# Patient Record
Sex: FEMALE | Race: WHITE | NOT HISPANIC OR LATINO | Employment: UNEMPLOYED | URBAN - METROPOLITAN AREA
[De-identification: names, ages, dates, MRNs, and addresses within clinical notes are randomized per-mention and may not be internally consistent; named-entity substitution may affect disease eponyms.]

---

## 2021-03-23 ENCOUNTER — GYNECOLOGY VISIT (OUTPATIENT)
Dept: OBGYN | Facility: CLINIC | Age: 31
End: 2021-03-23
Payer: MEDICAID

## 2021-03-23 ENCOUNTER — APPOINTMENT (OUTPATIENT)
Dept: OBGYN | Facility: CLINIC | Age: 31
End: 2021-03-23

## 2021-03-23 VITALS — SYSTOLIC BLOOD PRESSURE: 125 MMHG | DIASTOLIC BLOOD PRESSURE: 81 MMHG | WEIGHT: 243 LBS

## 2021-03-23 DIAGNOSIS — N93.8 DUB (DYSFUNCTIONAL UTERINE BLEEDING): Primary | ICD-10-CM

## 2021-03-23 PROCEDURE — 76857 US EXAM PELVIC LIMITED: CPT | Performed by: OBSTETRICS & GYNECOLOGY

## 2021-03-23 PROCEDURE — 99203 OFFICE O/P NEW LOW 30 MIN: CPT | Mod: 25 | Performed by: OBSTETRICS & GYNECOLOGY

## 2021-03-23 NOTE — NON-PROVIDER
Pt here today for   Pt states has not experienced nausea anymore.   Issac # [phone]   Pharmacy Confirmed.  UPT

## 2021-03-23 NOTE — PROGRESS NOTES
Jasmyne Gonzalo,  30 y.o.  female presents today with a C/O of :oligomenorrhea. Pt   Patient's last menstrual period was 10/31/2020 (approximate).  Transfer from [place]      Subjective : Nausea/Vomiting: No:  Abdominal /pelvic cramping : No :   vaginal bleeding:No      Menstrual Flow : moderate   GYN ROS:  normal menses, no abnormal bleeding, pelvic pain or discharge, no breast pain or new or enlarging lumps on self exam      History reviewed. No pertinent past medical history.    History reviewed. No pertinent surgical history.    Current Birth control:  none    OB History    Para Term  AB Living   2 1 1     1   SAB TAB Ectopic Molar Multiple Live Births             1      # Outcome Date GA Lbr Golden/2nd Weight Sex Delivery Anes PTL Lv   2 Current            1 Term 2016 40w0d   F Vag-Spont   ANGELO           Allergy:      Patient has no known allergies.    Exam;    /81 (BP Location: Right arm, Patient Position: Sitting)   Wt 110 kg (243 lb)   LMP 10/31/2020 (Approximate)   well-appearing, well-hydrated, well-nourished, alert, in no apparent distress  normal;   PERRLA, EOMI, fundi grossly normal, no papilledema, no AV nicking, sclera clear  Clear  RRR No M  abdomen is soft without significant tenderness, masses, organomegaly or guarding  deferredLab.    No results found for this or any previous visit (from the past 336 hour(s)).  Ultrasound :    Per my Read   Transabdominal     Second/third trimester findings: BPD: 5.04 cm  Placenta localization: posterior Low lying , no previa   BPD/ HC/FL, c/w 21 weeks 2 days   Positive fetal /cardiac movement   Likely male   US LYNN 2021  Dates LYNN 2021    Assessment:    dysfunctional uterine bleeding    Plan:  2 weeks for NOB

## 2021-03-25 ENCOUNTER — HOSPITAL ENCOUNTER (OUTPATIENT)
Dept: LAB | Facility: MEDICAL CENTER | Age: 31
End: 2021-03-25
Attending: NURSE PRACTITIONER
Payer: MEDICAID

## 2021-03-25 ENCOUNTER — HOSPITAL ENCOUNTER (OUTPATIENT)
Facility: MEDICAL CENTER | Age: 31
End: 2021-03-25
Attending: NURSE PRACTITIONER
Payer: MEDICAID

## 2021-03-25 ENCOUNTER — INITIAL PRENATAL (OUTPATIENT)
Dept: OBGYN | Facility: CLINIC | Age: 31
End: 2021-03-25
Payer: MEDICAID

## 2021-03-25 VITALS
SYSTOLIC BLOOD PRESSURE: 120 MMHG | WEIGHT: 243.9 LBS | DIASTOLIC BLOOD PRESSURE: 60 MMHG | HEIGHT: 71 IN | BODY MASS INDEX: 34.15 KG/M2

## 2021-03-25 DIAGNOSIS — Z34.82 ENCOUNTER FOR SUPERVISION OF OTHER NORMAL PREGNANCY IN SECOND TRIMESTER: ICD-10-CM

## 2021-03-25 DIAGNOSIS — Z34.82 ENCOUNTER FOR SUPERVISION OF OTHER NORMAL PREGNANCY IN SECOND TRIMESTER: Primary | ICD-10-CM

## 2021-03-25 PROBLEM — Z34.92 ENCOUNTER FOR SUPERVISION OF NORMAL PREGNANCY IN SECOND TRIMESTER: Status: ACTIVE | Noted: 2021-03-25

## 2021-03-25 LAB
ABO GROUP BLD: NORMAL
AMORPH CRY #/AREA URNS HPF: PRESENT /HPF
APPEARANCE UR: ABNORMAL
APPEARANCE UR: CLEAR
BACTERIA #/AREA URNS HPF: ABNORMAL /HPF
BASOPHILS # BLD AUTO: 0.4 % (ref 0–1.8)
BASOPHILS # BLD: 0.04 K/UL (ref 0–0.12)
BILIRUB UR QL STRIP.AUTO: NEGATIVE
BILIRUB UR STRIP-MCNC: ABNORMAL MG/DL
BLD GP AB SCN SERPL QL: NORMAL
COLOR UR AUTO: YELLOW
COLOR UR: YELLOW
EOSINOPHIL # BLD AUTO: 0.13 K/UL (ref 0–0.51)
EOSINOPHIL NFR BLD: 1.2 % (ref 0–6.9)
ERYTHROCYTE [DISTWIDTH] IN BLOOD BY AUTOMATED COUNT: 42.8 FL (ref 35.9–50)
GLUCOSE UR STRIP.AUTO-MCNC: NEGATIVE MG/DL
GLUCOSE UR STRIP.AUTO-MCNC: NEGATIVE MG/DL
HBV SURFACE AG SER QL: ABNORMAL
HCT VFR BLD AUTO: 36.1 % (ref 37–47)
HCV AB SER QL: NORMAL
HGB BLD-MCNC: 12 G/DL (ref 12–16)
HIV 1+2 AB+HIV1 P24 AG SERPL QL IA: NORMAL
IMM GRANULOCYTES # BLD AUTO: 0.14 K/UL (ref 0–0.11)
IMM GRANULOCYTES NFR BLD AUTO: 1.3 % (ref 0–0.9)
KETONES UR STRIP.AUTO-MCNC: NEGATIVE MG/DL
KETONES UR STRIP.AUTO-MCNC: NEGATIVE MG/DL
LEUKOCYTE ESTERASE UR QL STRIP.AUTO: NEGATIVE
LEUKOCYTE ESTERASE UR QL STRIP.AUTO: NEGATIVE
LYMPHOCYTES # BLD AUTO: 1.89 K/UL (ref 1–4.8)
LYMPHOCYTES NFR BLD: 17.2 % (ref 22–41)
MCH RBC QN AUTO: 29.6 PG (ref 27–33)
MCHC RBC AUTO-ENTMCNC: 33.2 G/DL (ref 33.6–35)
MCV RBC AUTO: 89.1 FL (ref 81.4–97.8)
MICRO URNS: ABNORMAL
MONOCYTES # BLD AUTO: 0.52 K/UL (ref 0–0.85)
MONOCYTES NFR BLD AUTO: 4.7 % (ref 0–13.4)
NEUTROPHILS # BLD AUTO: 8.29 K/UL (ref 2–7.15)
NEUTROPHILS NFR BLD: 75.2 % (ref 44–72)
NITRITE UR QL STRIP.AUTO: NEGATIVE
NITRITE UR QL STRIP.AUTO: NEGATIVE
NRBC # BLD AUTO: 0 K/UL
NRBC BLD-RTO: 0 /100 WBC
PH UR STRIP.AUTO: 7.5 [PH] (ref 5–8)
PH UR STRIP.AUTO: 8.5 [PH] (ref 5–8)
PLATELET # BLD AUTO: 434 K/UL (ref 164–446)
PMV BLD AUTO: 9.6 FL (ref 9–12.9)
PROT UR QL STRIP: ABNORMAL MG/DL
PROT UR QL STRIP: NEGATIVE MG/DL
RBC # BLD AUTO: 4.05 M/UL (ref 4.2–5.4)
RBC UR QL AUTO: NEGATIVE
RBC UR QL AUTO: NEGATIVE
RH BLD: NORMAL
RUBV AB SER QL: 29.5 IU/ML
SP GR UR STRIP.AUTO: 1.02
SP GR UR STRIP.AUTO: 1.02
TREPONEMA PALLIDUM IGG+IGM AB [PRESENCE] IN SERUM OR PLASMA BY IMMUNOASSAY: ABNORMAL
UROBILINOGEN UR STRIP-MCNC: ABNORMAL MG/DL
UROBILINOGEN UR STRIP.AUTO-MCNC: 0.2 MG/DL
WBC # BLD AUTO: 11 K/UL (ref 4.8–10.8)
WBC #/AREA URNS HPF: ABNORMAL /HPF

## 2021-03-25 PROCEDURE — 81001 URINALYSIS AUTO W/SCOPE: CPT | Mod: XU

## 2021-03-25 PROCEDURE — 87591 N.GONORRHOEAE DNA AMP PROB: CPT

## 2021-03-25 PROCEDURE — 86762 RUBELLA ANTIBODY: CPT

## 2021-03-25 PROCEDURE — 36415 COLL VENOUS BLD VENIPUNCTURE: CPT

## 2021-03-25 PROCEDURE — 86901 BLOOD TYPING SEROLOGIC RH(D): CPT

## 2021-03-25 PROCEDURE — 86803 HEPATITIS C AB TEST: CPT

## 2021-03-25 PROCEDURE — 88175 CYTOPATH C/V AUTO FLUID REDO: CPT

## 2021-03-25 PROCEDURE — 87491 CHLMYD TRACH DNA AMP PROBE: CPT

## 2021-03-25 PROCEDURE — 81511 FTL CGEN ABNOR FOUR ANAL: CPT

## 2021-03-25 PROCEDURE — 85025 COMPLETE CBC W/AUTO DIFF WBC: CPT

## 2021-03-25 PROCEDURE — 86850 RBC ANTIBODY SCREEN: CPT

## 2021-03-25 PROCEDURE — 86900 BLOOD TYPING SEROLOGIC ABO: CPT

## 2021-03-25 PROCEDURE — 87086 URINE CULTURE/COLONY COUNT: CPT

## 2021-03-25 PROCEDURE — 81002 URINALYSIS NONAUTO W/O SCOPE: CPT | Performed by: NURSE PRACTITIONER

## 2021-03-25 PROCEDURE — 86780 TREPONEMA PALLIDUM: CPT

## 2021-03-25 PROCEDURE — 0500F INITIAL PRENATAL CARE VISIT: CPT | Mod: 25 | Performed by: NURSE PRACTITIONER

## 2021-03-25 PROCEDURE — 87340 HEPATITIS B SURFACE AG IA: CPT

## 2021-03-25 PROCEDURE — 80307 DRUG TEST PRSMV CHEM ANLYZR: CPT

## 2021-03-25 PROCEDURE — 87389 HIV-1 AG W/HIV-1&-2 AB AG IA: CPT

## 2021-03-25 ASSESSMENT — ENCOUNTER SYMPTOMS
MUSCULOSKELETAL NEGATIVE: 1
RESPIRATORY NEGATIVE: 1
PSYCHIATRIC NEGATIVE: 1
NEUROLOGICAL NEGATIVE: 1
CONSTITUTIONAL NEGATIVE: 1
EYES NEGATIVE: 1
GASTROINTESTINAL NEGATIVE: 1
CARDIOVASCULAR NEGATIVE: 1

## 2021-03-25 NOTE — PROGRESS NOTES
"Subjective:      S:  Jasmyne Sánchez is a 30 y.o.  female  @ She is 20w5d with an LYNN of Estimated Date of Delivery: 21 based off of LMP who presents for her new OB exam.      Her OB hx includes  at term x1.   History of HSV I or II in self or partner: no  History of STIs in self or partner: no  History of Thyroid problems: no  History of bipolar depression, not on any medications currently. Feeling emotional with pregnancy, feels her moods are stable otherwise.     No ER visits or previous care in this pregnancy. She has no complaints.  Desires AFP.  Declines CF.  Reports positive FM, np VB, LOF, or cramping.  Denies dysuria, vaginal DC.  Pt is  and lives with family. FOB is involved and supportive. She is currently working at [employer], discussed heavy lifting, no chemical exposure.  Pregnancy is unplanned but welcomed.    O:    Vitals:    21 1315   BP: 120/60   Weight: 111 kg (243 lb 14.4 oz)   Height: 1.803 m (5' 11\")    See H&P Prenatal Physical.  Wet mount: not indicated        FHTs: 150        Fundal ht: 20cm     A:   1.  IUP @ 20w5d LYNN: Estimated Date of Delivery: 21 per LMP         2.  S=D        3.    Patient Active Problem List    Diagnosis Date Noted   • Encounter for supervision of normal pregnancy in second trimester 2021         P:  1.  GC/CT today. Pap today.         2.  Prenatal labs and UDS ordered - lab slip given        3.  Discussed diet and exercise during pregnancy. Encouraged good nutrition, and daily exercise including walking or swimming. Discussed expected weight gain during pregnancy.              4.  Discussed adequate hydration during pregnancy.        5.  NOB packet given        6.  Return to office in 4 wks        7.  Complete OB US at next availability wks        8.  Pregnancy guide provided        9.  Not a candidate for Centering Pregnancy    HPI    Review of Systems   Constitutional: Negative.    HENT: Negative.    Eyes: Negative. " "   Respiratory: Negative.    Cardiovascular: Negative.    Gastrointestinal: Negative.    Genitourinary: Negative.    Musculoskeletal: Negative.    Skin: Negative.    Neurological: Negative.    Endo/Heme/Allergies: Negative.    Psychiatric/Behavioral: Negative.           Objective:     /60   Ht 1.803 m (5' 11\")   Wt 111 kg (243 lb 14.4 oz)   LMP 10/31/2020 (Approximate)   BMI 34.02 kg/m²      Physical Exam  Vitals and nursing note reviewed.   Constitutional:       Appearance: She is well-developed.   Cardiovascular:      Rate and Rhythm: Normal rate and regular rhythm.      Heart sounds: Normal heart sounds.   Pulmonary:      Effort: Pulmonary effort is normal.      Breath sounds: Normal breath sounds.   Abdominal:      Palpations: Abdomen is soft.   Genitourinary:     Vagina: Normal.      Comments: Uterus enlarged, c/w 20 wk ga  Musculoskeletal:         General: Normal range of motion.      Cervical back: Normal range of motion and neck supple.   Skin:     General: Skin is warm and dry.   Neurological:      Mental Status: She is alert and oriented to person, place, and time.      Deep Tendon Reflexes: Reflexes are normal and symmetric.   Psychiatric:         Behavior: Behavior normal.         Thought Content: Thought content normal.         Judgment: Judgment normal.                 Assessment/Plan:        1. Encounter for supervision of other normal pregnancy in second trimester    - AFP TETRA; Future  - HEP C VIRUS ANTIBODY; Future  - PREG CNTR PRENATAL PN; Future  - THINPREP RFLX HPV ASCUS W/CTNG; Future  - URINE DRUG SCREEN W/CONF (AR); Future  - US-OB 2ND 3RD TRI COMPLETE; Future  - URINE CULTURE(NEW); Future    "

## 2021-03-25 NOTE — PROGRESS NOTES
NOB today  LMP: 10/31/2020  Last pap: 2016  Phone # [phone]  Pharmacy confirmed  On PNV Yes  Cystic Fibrosis test offered. Declined   Flu vaccine Declined  No complaints as of today

## 2021-03-26 DIAGNOSIS — Z34.82 ENCOUNTER FOR SUPERVISION OF OTHER NORMAL PREGNANCY IN SECOND TRIMESTER: ICD-10-CM

## 2021-03-26 LAB
C TRACH DNA GENITAL QL NAA+PROBE: NEGATIVE
CYTOLOGY REG CYTOL: NORMAL
N GONORRHOEA DNA GENITAL QL NAA+PROBE: NEGATIVE
SPECIMEN SOURCE: NORMAL

## 2021-03-27 LAB
AMPHET CTO UR CFM-MCNC: NEGATIVE NG/ML
BACTERIA UR CULT: NORMAL
BARBITURATES CTO UR CFM-MCNC: NEGATIVE NG/ML
BENZODIAZ CTO UR CFM-MCNC: NEGATIVE NG/ML
CANNABINOIDS CTO UR CFM-MCNC: POSITIVE NG/ML
COCAINE CTO UR CFM-MCNC: NEGATIVE NG/ML
DRUG COMMENT 753798: NORMAL
METHADONE CTO UR CFM-MCNC: NEGATIVE NG/ML
OPIATES CTO UR CFM-MCNC: NEGATIVE NG/ML
PCP CTO UR CFM-MCNC: NEGATIVE NG/ML
PROPOXYPH CTO UR CFM-MCNC: NEGATIVE NG/ML
SIGNIFICANT IND 70042: NORMAL
SITE SITE: NORMAL
SOURCE SOURCE: NORMAL

## 2021-03-28 LAB
# FETUSES US: NORMAL
AFP MOM SERPL: 1.21
AFP SERPL-MCNC: 55 NG/ML
AGE - REPORTED: 32.6 YR
CURRENT SMOKER: NO
FAMILY MEMBER DISEASES HX: NO
GA METHOD: NORMAL
GA: NORMAL WK
HCG MOM SERPL: 3.33
HCG SERPL-ACNC: NORMAL IU/L
HX OF HEREDITARY DISORDERS: NO
IDDM PATIENT QL: NO
INHIBIN A MOM SERPL: 1.36
INHIBIN A SERPL-MCNC: 188 PG/ML
INTEGRATED SCN PATIENT-IMP: NORMAL
PATHOLOGY STUDY: NORMAL
SPECIMEN DRAWN SERPL: NORMAL
U ESTRIOL MOM SERPL: 0.7
U ESTRIOL SERPL-MCNC: 1.65 NG/ML

## 2021-03-29 LAB — THC UR CFM-MCNC: 18 NG/ML

## 2021-04-01 ENCOUNTER — APPOINTMENT (OUTPATIENT)
Dept: RADIOLOGY | Facility: IMAGING CENTER | Age: 31
End: 2021-04-01
Attending: NURSE PRACTITIONER
Payer: MEDICAID

## 2021-04-01 DIAGNOSIS — Z34.82 ENCOUNTER FOR SUPERVISION OF OTHER NORMAL PREGNANCY IN SECOND TRIMESTER: ICD-10-CM

## 2021-04-01 PROCEDURE — 76805 OB US >/= 14 WKS SNGL FETUS: CPT | Mod: TC | Performed by: NURSE PRACTITIONER

## 2021-04-22 ENCOUNTER — ROUTINE PRENATAL (OUTPATIENT)
Dept: OBGYN | Facility: CLINIC | Age: 31
End: 2021-04-22
Payer: MEDICAID

## 2021-04-22 VITALS — BODY MASS INDEX: 33.92 KG/M2 | WEIGHT: 243.2 LBS | DIASTOLIC BLOOD PRESSURE: 80 MMHG | SYSTOLIC BLOOD PRESSURE: 120 MMHG

## 2021-04-22 DIAGNOSIS — Z34.82 ENCOUNTER FOR SUPERVISION OF OTHER NORMAL PREGNANCY IN SECOND TRIMESTER: ICD-10-CM

## 2021-04-22 PROCEDURE — 90040 PR PRENATAL FOLLOW UP: CPT | Performed by: NURSE PRACTITIONER

## 2021-04-22 NOTE — PROGRESS NOTES
OB follow up   + fetal movement. Active  No VB, LOF or UC's.  Phone # [phone]  Preferred pharmacy confirmed.  C/o  Not able to get comfortable when trying to go to sleep.

## 2021-04-22 NOTE — LETTER
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  This patient is pregnant and may continue to work. She is experiencing fatigue, back ache and feet swelling and it is our recommendation that she cut back her daily work hours to no more than 6 hours a day. She should not lift greater than 20 pounds and requires frequent rest periods (10 minutes every two hours).        Thank you,          MOY Gold.

## 2021-05-10 ENCOUNTER — HOSPITAL ENCOUNTER (OUTPATIENT)
Dept: LAB | Facility: MEDICAL CENTER | Age: 31
End: 2021-05-10
Attending: NURSE PRACTITIONER
Payer: MEDICAID

## 2021-05-10 DIAGNOSIS — Z34.82 ENCOUNTER FOR SUPERVISION OF OTHER NORMAL PREGNANCY IN SECOND TRIMESTER: ICD-10-CM

## 2021-05-10 LAB
ERYTHROCYTE [DISTWIDTH] IN BLOOD BY AUTOMATED COUNT: 39.8 FL (ref 35.9–50)
GLUCOSE 1H P 50 G GLC PO SERPL-MCNC: 102 MG/DL (ref 70–139)
HCT VFR BLD AUTO: 34.6 % (ref 37–47)
HGB BLD-MCNC: 11.5 G/DL (ref 12–16)
MCH RBC QN AUTO: 28.8 PG (ref 27–33)
MCHC RBC AUTO-ENTMCNC: 33.2 G/DL (ref 33.6–35)
MCV RBC AUTO: 86.5 FL (ref 81.4–97.8)
PLATELET # BLD AUTO: 439 K/UL (ref 164–446)
PMV BLD AUTO: 9.5 FL (ref 9–12.9)
RBC # BLD AUTO: 4 M/UL (ref 4.2–5.4)
TREPONEMA PALLIDUM IGG+IGM AB [PRESENCE] IN SERUM OR PLASMA BY IMMUNOASSAY: NORMAL
WBC # BLD AUTO: 12.4 K/UL (ref 4.8–10.8)

## 2021-05-10 PROCEDURE — 86780 TREPONEMA PALLIDUM: CPT

## 2021-05-10 PROCEDURE — 82950 GLUCOSE TEST: CPT

## 2021-05-10 PROCEDURE — 36415 COLL VENOUS BLD VENIPUNCTURE: CPT

## 2021-05-10 PROCEDURE — 85027 COMPLETE CBC AUTOMATED: CPT

## 2021-05-20 ENCOUNTER — ROUTINE PRENATAL (OUTPATIENT)
Dept: OBGYN | Facility: CLINIC | Age: 31
End: 2021-05-20
Payer: MEDICAID

## 2021-05-20 VITALS — BODY MASS INDEX: 34.92 KG/M2 | WEIGHT: 250.4 LBS | SYSTOLIC BLOOD PRESSURE: 130 MMHG | DIASTOLIC BLOOD PRESSURE: 70 MMHG

## 2021-05-20 DIAGNOSIS — Z34.83 ENCOUNTER FOR SUPERVISION OF OTHER NORMAL PREGNANCY IN THIRD TRIMESTER: Primary | ICD-10-CM

## 2021-05-20 DIAGNOSIS — O36.0990 RHESUS ISOIMMUNIZATION DURING PREGNANCY, ANTEPARTUM, SINGLE OR UNSPECIFIED FETUS: ICD-10-CM

## 2021-05-20 PROCEDURE — 90040 PR PRENATAL FOLLOW UP: CPT | Performed by: NURSE PRACTITIONER

## 2021-05-20 NOTE — LETTER
"Count Your Baby's Movements  Another step to a healthy delivery    Jasmyne Stjoshua              Dept: [phone]    How Many Weeks Pregnant? 28W5D    Date to Begin Countin2021              How to use this chart    One way for your physician to keep track of your baby's health is by knowing how often the baby moves (or \"kicks\") in your womb.  You can help your physician to do this by using this chart every day.    Every day, you should see how many hours it takes for your baby to move 10 times.  Start in the morning, as soon as you get up.    · First, write down the time your baby moves until you get to 10.  · Check off one box every time your baby moves until you get to 10.  · Write down the time you finished counting in the last column.  · Total how long it took to count up all 10 movements.  · Finally, fill in the box that shows how long this took.  After counting 10 movements, you no longer have to count any more that day.  The next morning, just start counting again as soon as you get up.    What should you call a \"movement\"?  It is hard to say, because it will feel different from one mother to another and from one pregnancy to the next.  The important thing is that you count the movements the same way throughout your pregnancy.  If you have more questions, you should ask your physician.    Count carefully every day!  SAMPLE:  Week 28    How many hours did it take to feel 10 movements?       Start  Time     1     2     3     4     5     6     7     8     9     10   Finish Time   Mon 8:20 ·  ·  ·  ·  ·  ·  ·  ·  ·  ·  11:40                  Sat               Sun                 IMPORTANT: You should contact your physician if it takes more than two hours for you to feel 10 movements.  Each morning, write down the time and start to count the movements of your baby.  Keep track by checking off one box every time you feel one movement.  When you have " "felt 10 \"kicks\", write down the time you finished counting in the last column.  Then fill in the   box (over the check zackary) for the number of hours it took.  Be sure to read the complete instructions on the previous page.            "

## 2021-05-20 NOTE — PROGRESS NOTES
OB follow up   + fetal movement. Active  No VB, LOF or UC's.  Phone # [phone]  Preferred pharmacy confirmed.  C/o  Dizziness comes and goes   Cramping   TDAP Declined   BTL Signed today   Kick count sheet given today.  Rhogam Today     Rhogam  NDC: 30523-794-92  LOT#: [id number]  Expiration Date: 2023  Dose: 1500 Units  Site: Right Upper Outer Quadrant Gluteal  Patient educated on use and side effects of medication. Name and  verified prior to injection. Pt tolerated? Well   Verified by ANTONIA  Administered by Chantell Kam, Med Ass't at 2:06 PM.  Patient Provided Medication: no

## 2021-06-03 ENCOUNTER — ROUTINE PRENATAL (OUTPATIENT)
Dept: OBGYN | Facility: CLINIC | Age: 31
End: 2021-06-03
Payer: MEDICAID

## 2021-06-03 VITALS — BODY MASS INDEX: 35.01 KG/M2 | WEIGHT: 251 LBS | SYSTOLIC BLOOD PRESSURE: 118 MMHG | DIASTOLIC BLOOD PRESSURE: 58 MMHG

## 2021-06-03 DIAGNOSIS — Z34.83 ENCOUNTER FOR SUPERVISION OF OTHER NORMAL PREGNANCY IN THIRD TRIMESTER: Primary | ICD-10-CM

## 2021-06-03 PROCEDURE — 90471 IMMUNIZATION ADMIN: CPT | Performed by: NURSE PRACTITIONER

## 2021-06-03 PROCEDURE — 90040 PR PRENATAL FOLLOW UP: CPT | Performed by: NURSE PRACTITIONER

## 2021-06-03 PROCEDURE — 90715 TDAP VACCINE 7 YRS/> IM: CPT | Performed by: NURSE PRACTITIONER

## 2021-06-03 NOTE — PROGRESS NOTES
Pt here today for OB follow up  Reports +FM  WT: 251 lb  BP: 118/58  Preferred pharmacy verified with pt.  Pt states has been having lower abdominal cramping. States no other complaints or concerns.   Pt desires the Tdap vaccine today  Good # [phone]    Tdap vaccine given today. Right Deltoid. VIS given and screening check list reviewed with pt.  Tdap vaccine verified by Lilia Kim (MA)

## 2021-06-03 NOTE — PROGRESS NOTES
S:  Pt is  at 30w5d for routine OB follow up.  Reports cramping, nothing regkular. No ED or hospital visits since last seen. Reports good FM.  Denies VB, LOF, RUCs or vaginal DC. Pt has flight to [place] on , returning . Discussed safe travel precautions: plenty of water, aisle seating, access to bathroom, walking aisles during flight. Pt agrees to make this her last travel until after babyis born    O:  Please see above vitals.        S=D           A:  IUP at 30w5d  Patient Active Problem List    Diagnosis Date Noted   • Encounter for supervision of normal pregnancy in second trimester 2021        P:  1.  PP contraception: desires BTL.        2.  Continue FKCs.          3.  Questions answered.          4.  Encouraged pt to tour L&D.          5.  Encourage adequate water intake.        6.  F/u on 21 for ARIN.        7.  Tdap today.

## 2021-06-21 ENCOUNTER — ROUTINE PRENATAL (OUTPATIENT)
Dept: OBGYN | Facility: CLINIC | Age: 31
End: 2021-06-21
Payer: MEDICAID

## 2021-06-21 VITALS — DIASTOLIC BLOOD PRESSURE: 74 MMHG | BODY MASS INDEX: 36.01 KG/M2 | SYSTOLIC BLOOD PRESSURE: 126 MMHG | WEIGHT: 258.2 LBS

## 2021-06-21 DIAGNOSIS — Z34.83 ENCOUNTER FOR SUPERVISION OF OTHER NORMAL PREGNANCY IN THIRD TRIMESTER: Primary | ICD-10-CM

## 2021-06-21 PROCEDURE — 90040 PR PRENATAL FOLLOW UP: CPT | Performed by: NURSE PRACTITIONER

## 2021-06-21 NOTE — PROGRESS NOTES
OB follow up   + fetal movement. Active  No VB, LOF or UC's.  Phone # [phone]  Preferred pharmacy confirmed.  No complaints as of today

## 2021-07-13 ENCOUNTER — HOSPITAL ENCOUNTER (OUTPATIENT)
Facility: MEDICAL CENTER | Age: 31
End: 2021-07-13
Attending: NURSE PRACTITIONER
Payer: MEDICAID

## 2021-07-13 ENCOUNTER — ROUTINE PRENATAL (OUTPATIENT)
Dept: OBGYN | Facility: CLINIC | Age: 31
End: 2021-07-13
Payer: MEDICAID

## 2021-07-13 VITALS — SYSTOLIC BLOOD PRESSURE: 120 MMHG | WEIGHT: 258.7 LBS | DIASTOLIC BLOOD PRESSURE: 60 MMHG | BODY MASS INDEX: 36.08 KG/M2

## 2021-07-13 DIAGNOSIS — Z34.83 ENCOUNTER FOR SUPERVISION OF OTHER NORMAL PREGNANCY IN THIRD TRIMESTER: ICD-10-CM

## 2021-07-13 DIAGNOSIS — Z34.83 ENCOUNTER FOR SUPERVISION OF OTHER NORMAL PREGNANCY IN THIRD TRIMESTER: Primary | ICD-10-CM

## 2021-07-13 PROCEDURE — 90040 PR PRENATAL FOLLOW UP: CPT | Performed by: NURSE PRACTITIONER

## 2021-07-13 PROCEDURE — 87150 DNA/RNA AMPLIFIED PROBE: CPT

## 2021-07-13 PROCEDURE — 87081 CULTURE SCREEN ONLY: CPT

## 2021-07-13 NOTE — PROGRESS NOTES
OB follow up   + fetal movement. Active  No VB, LOF or UC's.  Phone # [phone]  Preferred pharmacy confirmed.  C/o  Bad heart burn   Heat Rash in between   GBS Today

## 2021-07-14 PROBLEM — B95.1 GROUP BETA STREP POSITIVE: Status: ACTIVE | Noted: 2021-07-14

## 2021-07-14 LAB — GP B STREP DNA SPEC QL NAA+PROBE: POSITIVE

## 2021-07-19 ENCOUNTER — ROUTINE PRENATAL (OUTPATIENT)
Dept: OBGYN | Facility: CLINIC | Age: 31
End: 2021-07-19
Payer: MEDICAID

## 2021-07-19 VITALS — DIASTOLIC BLOOD PRESSURE: 74 MMHG | WEIGHT: 258 LBS | SYSTOLIC BLOOD PRESSURE: 118 MMHG | BODY MASS INDEX: 35.98 KG/M2

## 2021-07-19 DIAGNOSIS — O26.899 RH NEGATIVE STATE IN ANTEPARTUM PERIOD: ICD-10-CM

## 2021-07-19 DIAGNOSIS — Z34.82 ENCOUNTER FOR SUPERVISION OF OTHER NORMAL PREGNANCY IN SECOND TRIMESTER: Primary | ICD-10-CM

## 2021-07-19 DIAGNOSIS — B95.1 GROUP BETA STREP POSITIVE: ICD-10-CM

## 2021-07-19 DIAGNOSIS — Z67.91 RH NEGATIVE STATE IN ANTEPARTUM PERIOD: ICD-10-CM

## 2021-07-19 PROCEDURE — 90040 PR PRENATAL FOLLOW UP: CPT | Performed by: NURSE PRACTITIONER

## 2021-07-19 NOTE — PROGRESS NOTES
S:  Reports an irr UC occasionally.  Reports good FM.  Denies VB, LOF, RUCs, or vaginal DC. Desires SVE today.    O:    Vitals:    07/19/21 0935   BP: 118/74   Weight: 117 kg (258 lb)     See flow sheet.    A:  IUP at 37w2d  Patient Active Problem List    Diagnosis Date Noted   • Rh negative state in antepartum period 07/19/2021   • Group beta Strep positive 07/14/2021   • Encounter for supervision of normal pregnancy in second trimester 03/25/2021       P:  1.  Continue FKCs.         2.  Labor precautions given.  Instructions given on where to go.  Pt receptive to education.         3.  GBS positive - reviewed w pt.       4.  Questions answered.         5.  Encouraged adequate water intake       6.  F/u 1wk       7.  L&D policies reviewed.    Chaperone offered and provided by Hailey Knight MA.

## 2021-07-19 NOTE — PROGRESS NOTES
OB follow up   + fetal movement.  No VB, LOF or UC's.  Phone # [phone]  Preferred pharmacy confirmed.  GBS positive

## 2021-07-27 ENCOUNTER — HOSPITAL ENCOUNTER (EMERGENCY)
Facility: MEDICAL CENTER | Age: 31
End: 2021-07-27
Attending: OBSTETRICS & GYNECOLOGY | Admitting: OBSTETRICS & GYNECOLOGY
Payer: MEDICAID

## 2021-07-27 VITALS
BODY MASS INDEX: 36.12 KG/M2 | RESPIRATION RATE: 18 BRPM | HEART RATE: 76 BPM | TEMPERATURE: 97.9 F | DIASTOLIC BLOOD PRESSURE: 81 MMHG | SYSTOLIC BLOOD PRESSURE: 132 MMHG | HEIGHT: 71 IN | WEIGHT: 258 LBS

## 2021-07-27 PROCEDURE — 59025 FETAL NON-STRESS TEST: CPT

## 2021-07-27 PROCEDURE — 302449 STATCHG TRIAGE ONLY (STATISTIC)

## 2021-07-27 ASSESSMENT — PAIN SCALES - GENERAL: PAINLEVEL: 2

## 2021-07-27 NOTE — PROGRESS NOTES
1020: Report received from Jessica GARCIA RN.     1103: Discharge orders received from Dr. Crystal MD aware of elevated BP's.     1200: Discharge instructions gone over with patient, all questions and concerns addressed.

## 2021-07-27 NOTE — DISCHARGE INSTRUCTIONS
Hypertension During Pregnancy  High blood pressure (hypertension) is when the force of blood pumping through the arteries is too strong. Arteries are blood vessels that carry blood from the heart throughout the body. Hypertension during pregnancy can be mild or severe. Severe hypertension during pregnancy (preeclampsia) is a medical emergency that requires prompt evaluation and treatment.  Different types of hypertension can happen during pregnancy. These include:  · Chronic hypertension. This happens when you had high blood pressure before you became pregnant, and it continues during the pregnancy. Hypertension that develops before you are 20 weeks pregnant and continues during the pregnancy is also called chronic hypertension. If you have chronic hypertension, it will not go away after you have your baby. You will need follow-up visits with your health care provider after you have your baby. Your doctor may want you to keep taking medicine for your blood pressure.  · Gestational hypertension. This is hypertension that develops after the 20th week of pregnancy. Gestational hypertension usually goes away after you have your baby, but your health care provider will need to monitor your blood pressure to make sure that it is getting better.  · Preeclampsia. This is severe hypertension during pregnancy. This can cause serious complications for you and your baby and can also cause complications for you after the delivery of your baby.  · Postpartum preeclampsia. You may develop severe hypertension after giving birth. This usually occurs within 48 hours after childbirth but may occur up to 6 weeks after giving birth. This is rare.  How does this affect me?  Women who have hypertension during pregnancy have a greater chance of developing hypertension later in life or during future pregnancies. In some cases, hypertension during pregnancy can cause serious complications, such as:  · Stroke.  · Heart attack.  · Injury to  other organs, such as kidneys, lungs, or liver.  · Preeclampsia.  · Convulsions or seizures.  · Placental abruption.  How does this affect my baby?  Hypertension during pregnancy can affect your baby. Your baby may:  · Be born early (prematurely).  · Not weigh as much as he or she should at birth (low birth weight).  · Not tolerate labor well, leading to an unplanned  delivery.  What are the risks?  There are certain factors that make it more likely for you to develop hypertension during pregnancy. These include:  · Having hypertension during a previous pregnancy.  · Being overweight.  · Being age 35 or older.  · Being pregnant for the first time.  · Being pregnant with more than one baby.  · Becoming pregnant using fertilization methods, such as IVF (in vitro fertilization).  · Having other medical problems, such as diabetes, kidney disease, or lupus.  · Having a family history of hypertension.  What can I do to lower my risk?  The exact cause of hypertension during pregnancy is not known. You may be able to lower your risk by:  · Maintaining a healthy weight.  · Eating a healthy and balanced diet.  · Following your health care provider's instructions about treating any long-term conditions that you had before becoming pregnant.  It is very important to keep all of your prenatal care appointments. Your health care provider will check your blood pressure and make sure that your pregnancy is progressing as expected. If a problem is found, early treatment can prevent complications.  How is this treated?  Treatment for hypertension during pregnancy varies depending on the type of hypertension you have and how serious it is.  · If you were taking medicine for high blood pressure before you became pregnant, talk with your health care provider. You may need to change medicine during pregnancy because some medicines, like ACE inhibitors, may not be considered safe for your baby.  · If you have gestational  hypertension, your health care provider may order medicine to treat this during pregnancy.  · If you are at risk for preeclampsia, your health care provider may recommend that you take a low-dose aspirin during your pregnancy.  · If you have severe hypertension, you may need to be hospitalized so you and your baby can be monitored closely. You may also need to be given medicine to lower your blood pressure. This medicine may be given by mouth or through an IV.  · In some cases, if your condition gets worse, you may need to deliver your baby early.  Follow these instructions at home:  Eating and drinking    · Drink enough fluid to keep your urine pale yellow.  · Avoid caffeine.  Lifestyle  · Do not use any products that contain nicotine or tobacco, such as cigarettes, e-cigarettes, and chewing tobacco. If you need help quitting, ask your health care provider.  · Do not use alcohol or drugs.  · Avoid stress as much as possible.  · Rest and get plenty of sleep.  · Regular exercise can help to reduce your blood pressure. Ask your health care provider what kinds of exercise are best for you.  General instructions  · Take over-the-counter and prescription medicines only as told by your health care provider.  · Keep all prenatal and follow-up visits as told by your health care provider. This is important.  Contact a health care provider if:  · You have symptoms that your health care provider told you may require more treatment or monitoring, such as:  ? Headaches.  ? Nausea or vomiting.  ? Abdominal pain.  ? Dizziness.  ? Light-headedness.  Get help right away if:  · You have:  ? Severe abdominal pain that does not get better with treatment.  ? A severe headache that does not get better.  ? Vomiting that does not get better.  ? Sudden, rapid weight gain.  ? Sudden swelling in your hands, ankles, or face.  ? Vaginal bleeding.  ? Blood in your urine.  ? Blurred or double vision.  ? Shortness of breath or chest  pain.  ? Weakness on one side of your body.  ? Difficulty speaking.  · Your baby is not moving as much as usual.  Summary  · High blood pressure (hypertension) is when the force of blood pumping through the arteries is too strong.  · Hypertension during pregnancy can cause problems for you and your baby.  · Treatment for hypertension during pregnancy varies depending on the type of hypertension you have and how serious it is.  · Keep all prenatal and follow-up visits as told by your health care provider. This is important.  This information is not intended to replace advice given to you by your health care provider. Make sure you discuss any questions you have with your health care provider.  Document Released: 09/04/2012 Document Revised: 04/09/2020 Document Reviewed: 01/14/2020  [employer] Patient Education © 2020 [employer]  Labor Instructions (37 - 39 weeks):  Call your physician or return to hospital if:  · You have regular contractions that get progressively closer, longer and stronger.  · Your water breaks (remember time and color).  · You have bleeding like a period.  · Your baby does not move enough to complete the daily kick counts (10 movements in 2 hours)  · Your baby moves much less often than on the days before or you have not felt your baby move all day.

## 2021-07-27 NOTE — PROGRESS NOTES
EDC - 21 EGA - 38.3    1003 - Pt arrived to labor and delivery for lower back pain from this morning. Pt is unsure if it is UCs. Pt placed in room LDA 5. External monitors in place X2. Category I FHT at this time. B/p elevated, will cycle. Pt reports good FM. No complaints of ROM or vaginal bleeding. SVE /-2. FOB at bedside. POC discussed with pt and family members, all questions answered.   1020 Report given to Asmita LUNA. Resident updated on pt status.

## 2021-07-29 ENCOUNTER — ROUTINE PRENATAL (OUTPATIENT)
Dept: OBGYN | Facility: CLINIC | Age: 31
End: 2021-07-29
Payer: MEDICAID

## 2021-07-29 VITALS — WEIGHT: 256.9 LBS | BODY MASS INDEX: 35.83 KG/M2 | SYSTOLIC BLOOD PRESSURE: 112 MMHG | DIASTOLIC BLOOD PRESSURE: 74 MMHG

## 2021-07-29 DIAGNOSIS — Z34.93 THIRD TRIMESTER PREGNANCY: ICD-10-CM

## 2021-07-29 PROCEDURE — 90040 PR PRENATAL FOLLOW UP: CPT | Performed by: OBSTETRICS & GYNECOLOGY

## 2021-08-08 ENCOUNTER — HOSPITAL ENCOUNTER (EMERGENCY)
Facility: MEDICAL CENTER | Age: 31
End: 2021-08-09
Attending: OBSTETRICS & GYNECOLOGY | Admitting: OBSTETRICS & GYNECOLOGY
Payer: MEDICAID

## 2021-08-08 VITALS
DIASTOLIC BLOOD PRESSURE: 76 MMHG | SYSTOLIC BLOOD PRESSURE: 128 MMHG | HEIGHT: 71 IN | BODY MASS INDEX: 35.98 KG/M2 | WEIGHT: 257 LBS | TEMPERATURE: 97.7 F | OXYGEN SATURATION: 98 % | HEART RATE: 86 BPM

## 2021-08-09 ENCOUNTER — HOSPITAL ENCOUNTER (OUTPATIENT)
Facility: MEDICAL CENTER | Age: 31
End: 2021-08-10
Attending: OBSTETRICS & GYNECOLOGY | Admitting: OBSTETRICS & GYNECOLOGY
Payer: MEDICAID

## 2021-08-09 ENCOUNTER — APPOINTMENT (OUTPATIENT)
Dept: RADIOLOGY | Facility: MEDICAL CENTER | Age: 31
End: 2021-08-09
Attending: ADVANCED PRACTICE MIDWIFE
Payer: MEDICAID

## 2021-08-09 VITALS
WEIGHT: 257 LBS | OXYGEN SATURATION: 96 % | HEART RATE: 88 BPM | BODY MASS INDEX: 35.98 KG/M2 | TEMPERATURE: 97 F | SYSTOLIC BLOOD PRESSURE: 132 MMHG | HEIGHT: 71 IN | RESPIRATION RATE: 16 BRPM | DIASTOLIC BLOOD PRESSURE: 76 MMHG

## 2021-08-09 PROCEDURE — A9270 NON-COVERED ITEM OR SERVICE: HCPCS | Performed by: NURSE PRACTITIONER

## 2021-08-09 PROCEDURE — 99283 EMERGENCY DEPT VISIT LOW MDM: CPT

## 2021-08-09 PROCEDURE — 700102 HCHG RX REV CODE 250 W/ 637 OVERRIDE(OP): Performed by: NURSE PRACTITIONER

## 2021-08-09 PROCEDURE — 59025 FETAL NON-STRESS TEST: CPT | Mod: XU

## 2021-08-09 PROCEDURE — 99282 EMERGENCY DEPT VISIT SF MDM: CPT | Performed by: ADVANCED PRACTICE MIDWIFE

## 2021-08-09 PROCEDURE — 76819 FETAL BIOPHYS PROFIL W/O NST: CPT

## 2021-08-09 RX ADMIN — DINOPROSTONE 0.5 MG: 0.5 GEL VAGINAL at 23:03

## 2021-08-09 ASSESSMENT — PAIN SCALES - GENERAL: PAINLEVEL: 0 - NO PAIN

## 2021-08-09 NOTE — ED PROVIDER NOTES
Emergency Obstetric Consultation     Date of Service  2021    Reason for Consultation  Chief Complaint   Patient presents with   • Contractions       History of Presenting Illness  30 y.o. female who presented 2021 with Reason for consult: contractionsContractions: Onset was 3-5 hours ago.    Perceived severity is mild.      Fetal activity: Perceived fetal activity is normal.            Patient presents for complaints of contractions and pelvic pain. She reports that throughout the evening her abdominal pain has worsened. Denies vaginal bleeding or LOF.     Review of Systems  Review of Systems   All other systems reviewed and are negative.      Obstetric History    OB History    Para Term  AB Living   5 1 1   3 1   SAB TAB Ectopic Molar Multiple Live Births     3       1      # Outcome Date GA Lbr Golden/2nd Weight Sex Delivery Anes PTL Lv   5 Current            4 Term 16 40w0d  3.175 kg (7 lb) F Vag-Spont EPI N ANGELO   3 TAB            2 TAB            1 TAB                Gynecologic History  Patient's last menstrual period was 10/31/2020 (approximate).    Medical History  Past Medical History:   Diagnosis Date   • Rh negative state in antepartum period 2021       Surgical History   has no past surgical history on file.    Family History  family history includes Cancer in her father and mother.    Social History   reports that she has never smoked. She has never used smokeless tobacco. She reports previous alcohol use. She reports current drug use. Drug: Marijuana.    Medications  Medications Prior to Admission   Medication Sig Dispense Refill Last Dose   • Prenatal Vit-Fe Fumarate-FA (PRENATAL 1+1 PO) Take  by mouth.          Allergies  No Known Allergies    Physical Exam  Maternal Exam:  Uterine Assessment: Contraction strength is mild.  Contraction frequency is irregular.     Abdomen: Patient reports no abdominal tenderness. Estimated fetal weight is 3500.    Fetal  presentation: vertex    Cervix: Cervix evaluated by digital exam.          Laboratory               No results for input(s): NTPROBNP in the last 72 hours.           Assessment & Plan  Assessment:  Membrane status: intact.   1. 31 yo  with IUP at 40.2 weeks  2. GBS positive  3. Abdominal Pain  4. Cat II tracing    Plan:  1. Discussed minimal change with SVE.  2. BPP at this time related intermittent variable decels noted over extended monitoring x 2 hours. If WNL, can d/c home.           BROCK Shen

## 2021-08-09 NOTE — PROGRESS NOTES
EDC  = 40.1 weeks gestation here for contractions, denies LOF,-VB, +FM. EFM/TOCO placed, VSS, SVE: /-3. Denies complication with pregnancy, GBS +, states IOL tomorrow NOC.  : [facility] called-report given. Recheck cervix in 1 hr.  0: SVE 1.5/50-3. [facility] updated on decel- pt on back, repositioned, recheck cervix 1 hr and monitor for 1 hr.  0000: SVE: as charted, Carlos would like BPP.  0100: BPP . ISABEL [facility] called to update. OK To DC home at this time.

## 2021-08-10 ENCOUNTER — APPOINTMENT (OUTPATIENT)
Dept: OBGYN | Facility: MEDICAL CENTER | Age: 31
End: 2021-08-10
Attending: OBSTETRICS & GYNECOLOGY
Payer: MEDICAID

## 2021-08-10 ENCOUNTER — ANESTHESIA (OUTPATIENT)
Dept: ANESTHESIOLOGY | Facility: MEDICAL CENTER | Age: 31
End: 2021-08-10
Payer: MEDICAID

## 2021-08-10 ENCOUNTER — HOSPITAL ENCOUNTER (INPATIENT)
Facility: MEDICAL CENTER | Age: 31
LOS: 2 days | End: 2021-08-12
Attending: OBSTETRICS & GYNECOLOGY | Admitting: OBSTETRICS & GYNECOLOGY
Payer: MEDICAID

## 2021-08-10 ENCOUNTER — ANESTHESIA EVENT (OUTPATIENT)
Dept: ANESTHESIOLOGY | Facility: MEDICAL CENTER | Age: 31
End: 2021-08-10
Payer: MEDICAID

## 2021-08-10 LAB
BASOPHILS # BLD AUTO: 0.4 % (ref 0–1.8)
BASOPHILS # BLD: 0.04 K/UL (ref 0–0.12)
EOSINOPHIL # BLD AUTO: 0.09 K/UL (ref 0–0.51)
EOSINOPHIL NFR BLD: 0.9 % (ref 0–6.9)
ERYTHROCYTE [DISTWIDTH] IN BLOOD BY AUTOMATED COUNT: 43.2 FL (ref 35.9–50)
HCT VFR BLD AUTO: 36.1 % (ref 37–47)
HGB BLD-MCNC: 11.8 G/DL (ref 12–16)
HOLDING TUBE BB 8507: NORMAL
IMM GRANULOCYTES # BLD AUTO: 0.21 K/UL (ref 0–0.11)
IMM GRANULOCYTES NFR BLD AUTO: 2 % (ref 0–0.9)
LYMPHOCYTES # BLD AUTO: 2.24 K/UL (ref 1–4.8)
LYMPHOCYTES NFR BLD: 21.2 % (ref 22–41)
MCH RBC QN AUTO: 25.8 PG (ref 27–33)
MCHC RBC AUTO-ENTMCNC: 32.7 G/DL (ref 33.6–35)
MCV RBC AUTO: 79 FL (ref 81.4–97.8)
MONOCYTES # BLD AUTO: 0.64 K/UL (ref 0–0.85)
MONOCYTES NFR BLD AUTO: 6.1 % (ref 0–13.4)
NEUTROPHILS # BLD AUTO: 7.35 K/UL (ref 2–7.15)
NEUTROPHILS NFR BLD: 69.4 % (ref 44–72)
NRBC # BLD AUTO: 0 K/UL
NRBC BLD-RTO: 0 /100 WBC
PLATELET # BLD AUTO: 429 K/UL (ref 164–446)
PMV BLD AUTO: 10 FL (ref 9–12.9)
RBC # BLD AUTO: 4.57 M/UL (ref 4.2–5.4)
SARS-COV+SARS-COV-2 AG RESP QL IA.RAPID: NOTDETECTED
SARS-COV-2 RNA RESP QL NAA+PROBE: NOTDETECTED
SPECIMEN SOURCE: NORMAL
SPECIMEN SOURCE: NORMAL
WBC # BLD AUTO: 10.6 K/UL (ref 4.8–10.8)

## 2021-08-10 PROCEDURE — U0003 INFECTIOUS AGENT DETECTION BY NUCLEIC ACID (DNA OR RNA); SEVERE ACUTE RESPIRATORY SYNDROME CORONAVIRUS 2 (SARS-COV-2) (CORONAVIRUS DISEASE [COVID-19]), AMPLIFIED PROBE TECHNIQUE, MAKING USE OF HIGH THROUGHPUT TECHNOLOGIES AS DESCRIBED BY CMS-2020-01-R: HCPCS

## 2021-08-10 PROCEDURE — 700111 HCHG RX REV CODE 636 W/ 250 OVERRIDE (IP)

## 2021-08-10 PROCEDURE — A9270 NON-COVERED ITEM OR SERVICE: HCPCS

## 2021-08-10 PROCEDURE — 700102 HCHG RX REV CODE 250 W/ 637 OVERRIDE(OP)

## 2021-08-10 PROCEDURE — 700111 HCHG RX REV CODE 636 W/ 250 OVERRIDE (IP): Performed by: NURSE PRACTITIONER

## 2021-08-10 PROCEDURE — 85025 COMPLETE CBC W/AUTO DIFF WBC: CPT

## 2021-08-10 PROCEDURE — 87426 SARSCOV CORONAVIRUS AG IA: CPT

## 2021-08-10 PROCEDURE — 82570 ASSAY OF URINE CREATININE: CPT

## 2021-08-10 PROCEDURE — U0005 INFEC AGEN DETEC AMPLI PROBE: HCPCS

## 2021-08-10 PROCEDURE — 700105 HCHG RX REV CODE 258

## 2021-08-10 PROCEDURE — 700111 HCHG RX REV CODE 636 W/ 250 OVERRIDE (IP): Performed by: ANESTHESIOLOGY

## 2021-08-10 PROCEDURE — 700101 HCHG RX REV CODE 250: Performed by: ANESTHESIOLOGY

## 2021-08-10 PROCEDURE — 84156 ASSAY OF PROTEIN URINE: CPT

## 2021-08-10 PROCEDURE — 770002 HCHG ROOM/CARE - OB PRIVATE (112)

## 2021-08-10 PROCEDURE — 36415 COLL VENOUS BLD VENIPUNCTURE: CPT

## 2021-08-10 RX ORDER — BUPIVACAINE HYDROCHLORIDE 2.5 MG/ML
INJECTION, SOLUTION EPIDURAL; INFILTRATION; INTRACAUDAL
Status: COMPLETED
Start: 2021-08-10 | End: 2021-08-10

## 2021-08-10 RX ORDER — IBUPROFEN 800 MG/1
800 TABLET ORAL EVERY 8 HOURS PRN
Status: DISCONTINUED | OUTPATIENT
Start: 2021-08-10 | End: 2021-08-12 | Stop reason: HOSPADM

## 2021-08-10 RX ORDER — SODIUM CHLORIDE, SODIUM LACTATE, POTASSIUM CHLORIDE, CALCIUM CHLORIDE 600; 310; 30; 20 MG/100ML; MG/100ML; MG/100ML; MG/100ML
INJECTION, SOLUTION INTRAVENOUS CONTINUOUS
Status: ACTIVE | OUTPATIENT
Start: 2021-08-10 | End: 2021-08-10

## 2021-08-10 RX ORDER — BUPIVACAINE HYDROCHLORIDE 2.5 MG/ML
INJECTION, SOLUTION EPIDURAL; INFILTRATION; INTRACAUDAL
Status: COMPLETED | OUTPATIENT
Start: 2021-08-10 | End: 2021-08-10

## 2021-08-10 RX ORDER — ACETAMINOPHEN 500 MG
1000 TABLET ORAL EVERY 6 HOURS
Status: DISCONTINUED | OUTPATIENT
Start: 2021-08-10 | End: 2021-08-11

## 2021-08-10 RX ORDER — ROPIVACAINE HYDROCHLORIDE 2 MG/ML
INJECTION, SOLUTION EPIDURAL; INFILTRATION; PERINEURAL
Status: COMPLETED
Start: 2021-08-10 | End: 2021-08-10

## 2021-08-10 RX ORDER — ONDANSETRON 4 MG/1
4 TABLET, ORALLY DISINTEGRATING ORAL EVERY 6 HOURS PRN
Status: DISCONTINUED | OUTPATIENT
Start: 2021-08-10 | End: 2021-08-12 | Stop reason: HOSPADM

## 2021-08-10 RX ORDER — SODIUM CHLORIDE, SODIUM LACTATE, POTASSIUM CHLORIDE, AND CALCIUM CHLORIDE .6; .31; .03; .02 G/100ML; G/100ML; G/100ML; G/100ML
250 INJECTION, SOLUTION INTRAVENOUS PRN
Status: DISCONTINUED | OUTPATIENT
Start: 2021-08-10 | End: 2021-08-11 | Stop reason: HOSPADM

## 2021-08-10 RX ORDER — SODIUM CHLORIDE, SODIUM LACTATE, POTASSIUM CHLORIDE, AND CALCIUM CHLORIDE .6; .31; .03; .02 G/100ML; G/100ML; G/100ML; G/100ML
1000 INJECTION, SOLUTION INTRAVENOUS
Status: DISCONTINUED | OUTPATIENT
Start: 2021-08-10 | End: 2021-08-11 | Stop reason: HOSPADM

## 2021-08-10 RX ORDER — ROPIVACAINE HYDROCHLORIDE 2 MG/ML
INJECTION, SOLUTION EPIDURAL; INFILTRATION; PERINEURAL CONTINUOUS
Status: DISCONTINUED | OUTPATIENT
Start: 2021-08-10 | End: 2021-08-11 | Stop reason: HOSPADM

## 2021-08-10 RX ORDER — ONDANSETRON 2 MG/ML
4 INJECTION INTRAMUSCULAR; INTRAVENOUS EVERY 6 HOURS PRN
Status: DISCONTINUED | OUTPATIENT
Start: 2021-08-10 | End: 2021-08-12 | Stop reason: HOSPADM

## 2021-08-10 RX ORDER — ALUMINA, MAGNESIA, AND SIMETHICONE 2400; 2400; 240 MG/30ML; MG/30ML; MG/30ML
30 SUSPENSION ORAL EVERY 6 HOURS PRN
Status: DISCONTINUED | OUTPATIENT
Start: 2021-08-10 | End: 2021-08-11 | Stop reason: HOSPADM

## 2021-08-10 RX ORDER — LIDOCAINE HYDROCHLORIDE AND EPINEPHRINE 15; 5 MG/ML; UG/ML
INJECTION, SOLUTION EPIDURAL
Status: COMPLETED | OUTPATIENT
Start: 2021-08-10 | End: 2021-08-10

## 2021-08-10 RX ADMIN — ONDANSETRON 4 MG: 2 INJECTION INTRAMUSCULAR; INTRAVENOUS at 20:00

## 2021-08-10 RX ADMIN — SODIUM CHLORIDE 2.5 MILLION UNITS: 9 INJECTION, SOLUTION INTRAVENOUS at 20:19

## 2021-08-10 RX ADMIN — OXYTOCIN 2 MILLI-UNITS/MIN: 10 INJECTION, SOLUTION INTRAMUSCULAR; INTRAVENOUS at 12:16

## 2021-08-10 RX ADMIN — SODIUM CHLORIDE, POTASSIUM CHLORIDE, SODIUM LACTATE AND CALCIUM CHLORIDE: 600; 310; 30; 20 INJECTION, SOLUTION INTRAVENOUS at 12:17

## 2021-08-10 RX ADMIN — SODIUM CHLORIDE 2.5 MILLION UNITS: 9 INJECTION, SOLUTION INTRAVENOUS at 16:45

## 2021-08-10 RX ADMIN — FENTANYL CITRATE 100 MCG: 50 INJECTION, SOLUTION INTRAMUSCULAR; INTRAVENOUS at 15:25

## 2021-08-10 RX ADMIN — ROPIVACAINE HYDROCHLORIDE 200 MG: 2 INJECTION, SOLUTION EPIDURAL; INFILTRATION at 15:40

## 2021-08-10 RX ADMIN — ALUMINUM HYDROXIDE, MAGNESIUM HYDROXIDE, AND DIMETHICONE 30 ML: 400; 400; 40 SUSPENSION ORAL at 19:14

## 2021-08-10 RX ADMIN — EPHEDRINE SULFATE 5 MG: 50 INJECTION INTRAVENOUS at 16:01

## 2021-08-10 RX ADMIN — FAMOTIDINE 20 MG: 10 INJECTION INTRAVENOUS at 22:01

## 2021-08-10 RX ADMIN — BUPIVACAINE HYDROCHLORIDE 10 ML: 2.5 INJECTION, SOLUTION EPIDURAL; INFILTRATION; INTRACAUDAL; PERINEURAL at 15:25

## 2021-08-10 RX ADMIN — SODIUM CHLORIDE 5 MILLION UNITS: 900 INJECTION INTRAVENOUS at 12:17

## 2021-08-10 RX ADMIN — LIDOCAINE HYDROCHLORIDE,EPINEPHRINE BITARTRATE 3 ML: 15; .005 INJECTION, SOLUTION EPIDURAL; INFILTRATION; INTRACAUDAL; PERINEURAL at 15:25

## 2021-08-10 RX ADMIN — SODIUM CHLORIDE, POTASSIUM CHLORIDE, SODIUM LACTATE AND CALCIUM CHLORIDE: 600; 310; 30; 20 INJECTION, SOLUTION INTRAVENOUS at 15:50

## 2021-08-10 RX ADMIN — FENTANYL CITRATE 100 MCG: 50 INJECTION, SOLUTION INTRAMUSCULAR; INTRAVENOUS at 14:50

## 2021-08-10 ASSESSMENT — LIFESTYLE VARIABLES
EVER_SMOKED: NEVER
CONSUMPTION TOTAL: INCOMPLETE
EVER FELT BAD OR GUILTY ABOUT YOUR DRINKING: NO
HAVE YOU EVER FELT YOU SHOULD CUT DOWN ON YOUR DRINKING: NO
HAVE PEOPLE ANNOYED YOU BY CRITICIZING YOUR DRINKING: NO
TOTAL SCORE: 0
EVER HAD A DRINK FIRST THING IN THE MORNING TO STEADY YOUR NERVES TO GET RID OF A HANGOVER: NO
TOTAL SCORE: 0
DOES PATIENT WANT TO STOP DRINKING: NO
ALCOHOL_USE: NO
TOTAL SCORE: 0

## 2021-08-10 ASSESSMENT — COPD QUESTIONNAIRES
COPD SCREENING SCORE: 0
DURING THE PAST 4 WEEKS HOW MUCH DID YOU FEEL SHORT OF BREATH: NONE/LITTLE OF THE TIME
HAVE YOU SMOKED AT LEAST 100 CIGARETTES IN YOUR ENTIRE LIFE: NO/DON'T KNOW
IN THE PAST 12 MONTHS DO YOU DO LESS THAN YOU USED TO BECAUSE OF YOUR BREATHING PROBLEMS: DISAGREE/UNSURE
DO YOU EVER COUGH UP ANY MUCUS OR PHLEGM?: NO/ONLY WITH OCCASIONAL COLDS OR INFECTIONS

## 2021-08-10 ASSESSMENT — PATIENT HEALTH QUESTIONNAIRE - PHQ9
1. LITTLE INTEREST OR PLEASURE IN DOING THINGS: NOT AT ALL
SUM OF ALL RESPONSES TO PHQ9 QUESTIONS 1 AND 2: 0
2. FEELING DOWN, DEPRESSED, IRRITABLE, OR HOPELESS: NOT AT ALL
SUM OF ALL RESPONSES TO PHQ9 QUESTIONS 1 AND 2: 0
2. FEELING DOWN, DEPRESSED, IRRITABLE, OR HOPELESS: NOT AT ALL
1. LITTLE INTEREST OR PLEASURE IN DOING THINGS: NOT AT ALL

## 2021-08-10 ASSESSMENT — PAIN DESCRIPTION - PAIN TYPE
TYPE: ACUTE PAIN

## 2021-08-10 ASSESSMENT — PAIN SCALES - GENERAL: PAINLEVEL: 0 - NO PAIN

## 2021-08-10 NOTE — H&P
OB H&P:    CC: Planned IOL    HPI:  Ms. Jasmyne Olivas is a 30 y.o.  @ 40w3d by LMP with US. The patient reports that she was seen at L&D yesterday night and gel was placed for outpatient induction. Patient returns today for IOL. She denies any current contractions, vaginal bleeding or loss of fluid. She notes positive fetal movements. Mother is Rh-. She denies any other complications during the pregnancy. She denies any headache, shortness of breath, chest pain, fever, chills, nausea, vomiting, numbness, tingling or any other complaints. She states she would like an epidural.     Contractions: No   Loss of fluid: No   Vaginal bleeding: No   Fetal movement: present      PNC with [facility]    PNL:  Rh-/-, RI, HIV neg, TrepAb neg, HBsAg NR, GC/CT neg/neg  Glucola: 102 mg/dL gtt at 1 hour in 2nd trimester  GBS +      ROS:  Const: denies fevers, general concerns  CV/resp: reports no concerns  GI: denies abd pain, GI concerns  : see HPI  Neuro: denies HA/vision changes    OB History    Para Term  AB Living   5 1 1   3 1   SAB TAB Ectopic Molar Multiple Live Births     3       1      # Outcome Date GA Lbr Golden/2nd Weight Sex Delivery Anes PTL Lv   5 Current            4 Term 16 40w0d  3.175 kg (7 lb) F Vag-Spont EPI N ANGELO   3 TAB            2 TAB            1 TAB                GYN: denies STIs, no cervical procedures    Past Medical History:   Diagnosis Date   • Rh negative state in antepartum period 2021       History reviewed. No pertinent surgical history.    No current facility-administered medications on file prior to encounter.     Current Outpatient Medications on File Prior to Encounter   Medication Sig Dispense Refill   • famotidine (PEPCID) 10 MG/ML Solution Infuse 20 mg into a venous catheter 2 times a day.     • Prenatal Vit-Fe Fumarate-FA (PRENATAL 1+1 PO) Take  by mouth.         Family History   Problem Relation Age of Onset   • Cancer Mother    • Cancer Father        Social  "History     Tobacco Use   • Smoking status: Never Smoker   • Smokeless tobacco: Never Used   Vaping Use   • Vaping Use: Never used   Substance Use Topics   • Alcohol use: Not Currently   • Drug use: Yes     Types: Marijuana         PE:  Vitals:    08/10/21 0950 08/10/21 1002 08/10/21 1100   BP: 137/87 137/87    Pulse: 90 66 66   Resp: 17 17    Temp: 35.8 °C (96.5 °F) (!) 35.6 °C (96 °F)    TempSrc: Oral Oral    SpO2: 96% 96%    Weight: 117 kg (257 lb)     Height: 1.803 m (5' 11\")       gen: AAO, NAD  abd: soft, gravid, NT  Ext: NT, no edema    SVE: 3/60/-3 @ 1030  FHT: 140/moderate variability/+accels/ - decels  Bk: no consistent contractions noted on external tocometry    A/P: 30 y.o.  @ 40w3d by LMP with US. Here for IOL, received gel last night.      -IOL, starting pitocin   -IVF   -Monitor vitals   -CEFM and tocometry   -GBS+ will start penicillin prophylaxis      Dima Norwood M.D.        "

## 2021-08-10 NOTE — PROGRESS NOTES
Pt is a 30 year-old  EDC 21, 40.2 wks presenting to triage today for scheduled OP gel. Pt reports +FM, -VB, -LOF and feels infrequent UC's. Denies complications with this pregnancy. VSS. EFM and TOCO applied.   SVE by RN. See flowsheets.   Report given to MERARY Price CNM. Orders received for gel. Pt and s/o updated on POC. Agree to proceed.   - Prepidil gel placed. See MAR.   0005- MERARY Price CNM in department. FHT reviewed by STEPHIE. Orders received to discharge this pt home.   0010- Pt given discharge instructions. Educated to return for scheduled IOL. Educated to return sooner if feeling intense and frequent UC's q5 min, LOF, VB or decreased FM. Pt exits unit ambulatory in stable condition with significant other.

## 2021-08-10 NOTE — ANESTHESIA PROCEDURE NOTES
Epidural Block    Date/Time: 8/10/2021 3:25 PM  Performed by: Edilson Alfaro M.D.  Authorized by: Edilson Alfaro M.D.     Patient Location:  OB  Start Time:  8/10/2021 3:25 PM  End Time:  8/10/2021 3:40 PM  Reason for Block: labor analgesia    patient identified, IV checked, site marked, risks and benefits discussed, surgical consent, monitors and equipment checked, pre-op evaluation, timeout performed and at patient's request    Patient Position:  Sitting  Prep: ChloraPrep, patient draped and sterile technique    Monitoring:  Blood pressure, continuous pulse oximetry and heart rate  Approach:  Midline  Location:  L3-L4  Injection Technique:  VINOD air  Skin infiltration:  Lidocaine  Strength:  1%  Dose:  3ml  Needle Type:  Tuohy  Needle Gauge:  17 G  Needle Length:  3.5 in  Loss of resistance::  9  Catheter Size:  19 G  Catheter at Skin Depth:  18  Test Dose Result:  Negative

## 2021-08-10 NOTE — PROGRESS NOTES
0944 Pt arrived to L&D for scheduled IOL for post dates  Pt taken to S221, EFM and TOCO applied, POC discussed, orders received, admission profile completed, IV started, labs drawn and sent, VSS. Pt encouraged to call RN for any needs, wants, desires or concerns.   1030 SVE 3/50/-3.  1100 Dr. Norwood in department, status update given, will be by pt see patient.  1154 Dr. Norwood at bedside with u/s. Vertex presentation confirmed. Will start penicillin and pitocin, see MAR. Will continue with POC.  1525 Dr Alfaro at bedside to place epidural, time out completed, pt consented, all questions answered, pt verbalized understanding, consents signed.   1536 epidural placed, negative test dose appreciated, pt tolerated well.  1556 FHR down to 80's, pt BP dropped after epidural, pt repositioned to right and left side, IV fluid bolus continued, O2 applied by facemask, Dr. Altman phoned to bedside. Ephedrine admin, see MAR.   1601 Dr. Altman at bedside, SVE 3/80/-3. Will allow pt to recover and BP to stabilize then restart pitocin. Pt education provided, all questions answered, Will continue with POC.  1630 munoz placed, pt tolerated well. Pt resting comfortable in bed, denies needs, encouraged to call RN for any  Needs, wants, desires or concerns.   1824 two RN's at bedside, pt repositioned, pitocin off, iv bolus initiated.   1827 this RN at bedside, SVE 4-5/80/-2.  1830 Dr. Altman in department, update given, strip reviewed, will continue with POC.   1900 bedside report given to NOC RN, assumed care, POC discussed, all questions answered. VSS.

## 2021-08-10 NOTE — PROGRESS NOTES
Called to room for fetal deceleration to 80s for approximately 5 minutes s/p epidural placement.  BP noted to be low and patient given Ephedrine 5 mg prior to my arrival and pitocin turned off    [place] - irregular contractions  EFM - 130s, moderate variability, + accels  Cx - 3 cm/80%/-2    A/P: 30-year-old  5 para 1-0-3-1 at 40-3/7 weeks estimated gestational age here for induction of labor status post outpatient gel, now receiving Pitocin.  Fetal deceleration likely secondary to hypotension following epidural.  Blood pressure improved to 120s/ 80s, and fetal status is reassuring.  We will plan to rest for 30 minutes, and resume Pitocin.

## 2021-08-10 NOTE — CARE PLAN
Problem: Knowledge Deficit - L&D  Goal: Patient and family/caregivers will demonstrate understanding of plan of care, disease process/condition, diagnostic tests and medications  Note: Pt education regarding POC provided, all questions answered, pt verbalized understanding.       Problem: Risk for Injury  Goal: Patient and fetus will be free of preventable injury/complications  Flowsheets (Taken 8/10/2021 1211)  Resting Tone Palpated: Soft  Mode: External [place]  Contraction Quality: Moderate  Contraction Duration: < 1 minute

## 2021-08-11 LAB
ACTION RH IMMUNE GLOB 8505RHG: NORMAL
ALBUMIN SERPL BCP-MCNC: 3 G/DL (ref 3.2–4.9)
ALBUMIN/GLOB SERPL: 1 G/DL
ALP SERPL-CCNC: 237 U/L (ref 30–99)
ALT SERPL-CCNC: 12 U/L (ref 2–50)
ANION GAP SERPL CALC-SCNC: 10 MMOL/L (ref 7–16)
AST SERPL-CCNC: 22 U/L (ref 12–45)
BILIRUB SERPL-MCNC: 0.2 MG/DL (ref 0.1–1.5)
BUN SERPL-MCNC: 8 MG/DL (ref 8–22)
CALCIUM SERPL-MCNC: 8.7 MG/DL (ref 8.5–10.5)
CHLORIDE SERPL-SCNC: 105 MMOL/L (ref 96–112)
CO2 SERPL-SCNC: 21 MMOL/L (ref 20–33)
CREAT SERPL-MCNC: 0.47 MG/DL (ref 0.5–1.4)
CREAT UR-MCNC: 137.83 MG/DL
ERYTHROCYTE [DISTWIDTH] IN BLOOD BY AUTOMATED COUNT: 42.6 FL (ref 35.9–50)
GLOBULIN SER CALC-MCNC: 3 G/DL (ref 1.9–3.5)
GLUCOSE SERPL-MCNC: 94 MG/DL (ref 65–99)
HCT VFR BLD AUTO: 33.5 % (ref 37–47)
HGB BLD-MCNC: 10.9 G/DL (ref 12–16)
IMMUNE ROSETTING TEST 8505FMH: NORMAL
MCH RBC QN AUTO: 25.5 PG (ref 27–33)
MCHC RBC AUTO-ENTMCNC: 32.5 G/DL (ref 33.6–35)
MCV RBC AUTO: 78.5 FL (ref 81.4–97.8)
NUMBER OF RH DOSES IND 8505RD: 1
PLATELET # BLD AUTO: 388 K/UL (ref 164–446)
PMV BLD AUTO: 9.8 FL (ref 9–12.9)
POTASSIUM SERPL-SCNC: 4 MMOL/L (ref 3.6–5.5)
PROT SERPL-MCNC: 6 G/DL (ref 6–8.2)
PROT UR-MCNC: 85 MG/DL (ref 0–15)
PROT/CREAT UR: 617 MG/G (ref 10–107)
RBC # BLD AUTO: 4.27 M/UL (ref 4.2–5.4)
SODIUM SERPL-SCNC: 136 MMOL/L (ref 135–145)
URATE SERPL-MCNC: 3.8 MG/DL (ref 1.9–8.2)
WBC # BLD AUTO: 16.6 K/UL (ref 4.8–10.8)

## 2021-08-11 PROCEDURE — 10H07YZ INSERTION OF OTHER DEVICE INTO PRODUCTS OF CONCEPTION, VIA NATURAL OR ARTIFICIAL OPENING: ICD-10-PCS | Performed by: OBSTETRICS & GYNECOLOGY

## 2021-08-11 PROCEDURE — 770002 HCHG ROOM/CARE - OB PRIVATE (112)

## 2021-08-11 PROCEDURE — 700111 HCHG RX REV CODE 636 W/ 250 OVERRIDE (IP)

## 2021-08-11 PROCEDURE — 59409 OBSTETRICAL CARE: CPT

## 2021-08-11 PROCEDURE — 85461 HEMOGLOBIN FETAL: CPT

## 2021-08-11 PROCEDURE — 304965 HCHG RECOVERY SERVICES

## 2021-08-11 PROCEDURE — 80053 COMPREHEN METABOLIC PANEL: CPT

## 2021-08-11 PROCEDURE — 85027 COMPLETE CBC AUTOMATED: CPT

## 2021-08-11 PROCEDURE — A9270 NON-COVERED ITEM OR SERVICE: HCPCS

## 2021-08-11 PROCEDURE — 303615 HCHG EPIDURAL/SPINAL ANESTHESIA FOR LABOR

## 2021-08-11 PROCEDURE — 3E033VJ INTRODUCTION OF OTHER HORMONE INTO PERIPHERAL VEIN, PERCUTANEOUS APPROACH: ICD-10-PCS | Performed by: OBSTETRICS & GYNECOLOGY

## 2021-08-11 PROCEDURE — A9270 NON-COVERED ITEM OR SERVICE: HCPCS | Performed by: OBSTETRICS & GYNECOLOGY

## 2021-08-11 PROCEDURE — 700102 HCHG RX REV CODE 250 W/ 637 OVERRIDE(OP): Performed by: OBSTETRICS & GYNECOLOGY

## 2021-08-11 PROCEDURE — 700102 HCHG RX REV CODE 250 W/ 637 OVERRIDE(OP)

## 2021-08-11 PROCEDURE — 84550 ASSAY OF BLOOD/URIC ACID: CPT

## 2021-08-11 PROCEDURE — 36415 COLL VENOUS BLD VENIPUNCTURE: CPT

## 2021-08-11 PROCEDURE — 59400 OBSTETRICAL CARE: CPT | Performed by: NURSE PRACTITIONER

## 2021-08-11 RX ORDER — ACETAMINOPHEN 500 MG
1000 TABLET ORAL EVERY 6 HOURS PRN
Status: DISCONTINUED | OUTPATIENT
Start: 2021-08-11 | End: 2021-08-12 | Stop reason: HOSPADM

## 2021-08-11 RX ORDER — SODIUM CHLORIDE, SODIUM LACTATE, POTASSIUM CHLORIDE, CALCIUM CHLORIDE 600; 310; 30; 20 MG/100ML; MG/100ML; MG/100ML; MG/100ML
INJECTION, SOLUTION INTRAVENOUS PRN
Status: DISCONTINUED | OUTPATIENT
Start: 2021-08-11 | End: 2021-08-12 | Stop reason: HOSPADM

## 2021-08-11 RX ADMIN — IBUPROFEN 800 MG: 800 TABLET, FILM COATED ORAL at 08:16

## 2021-08-11 RX ADMIN — IBUPROFEN 800 MG: 800 TABLET, FILM COATED ORAL at 02:37

## 2021-08-11 RX ADMIN — IBUPROFEN 800 MG: 800 TABLET, FILM COATED ORAL at 20:04

## 2021-08-11 RX ADMIN — Medication 2000 ML/HR: at 00:05

## 2021-08-11 RX ADMIN — ACETAMINOPHEN 1000 MG: 500 TABLET ORAL at 15:03

## 2021-08-11 RX ADMIN — Medication 125 ML/HR: at 01:27

## 2021-08-11 ASSESSMENT — EDINBURGH POSTNATAL DEPRESSION SCALE (EPDS)
I HAVE FELT SCARED OR PANICKY FOR NO GOOD REASON: NO, NOT MUCH
I HAVE BEEN ANXIOUS OR WORRIED FOR NO GOOD REASON: YES, SOMETIMES
THE THOUGHT OF HARMING MYSELF HAS OCCURRED TO ME: HARDLY EVER
I HAVE LOOKED FORWARD WITH ENJOYMENT TO THINGS: AS MUCH AS I EVER DID
I HAVE BEEN SO UNHAPPY THAT I HAVE BEEN CRYING: ONLY OCCASIONALLY
THINGS HAVE BEEN GETTING ON TOP OF ME: NO, MOST OF THE TIME I HAVE COPED QUITE WELL
I HAVE FELT SAD OR MISERABLE: NO, NOT AT ALL
I HAVE BEEN ABLE TO LAUGH AND SEE THE FUNNY SIDE OF THINGS: AS MUCH AS I ALWAYS COULD
I HAVE BEEN SO UNHAPPY THAT I HAVE HAD DIFFICULTY SLEEPING: NOT VERY OFTEN
I HAVE BLAMED MYSELF UNNECESSARILY WHEN THINGS WENT WRONG: YES, MOST OF THE TIME

## 2021-08-11 ASSESSMENT — PATIENT HEALTH QUESTIONNAIRE - PHQ9
SUM OF ALL RESPONSES TO PHQ9 QUESTIONS 1 AND 2: 0
1. LITTLE INTEREST OR PLEASURE IN DOING THINGS: NOT AT ALL
2. FEELING DOWN, DEPRESSED, IRRITABLE, OR HOPELESS: NOT AT ALL

## 2021-08-11 ASSESSMENT — PAIN DESCRIPTION - PAIN TYPE
TYPE: ACUTE PAIN

## 2021-08-11 NOTE — CARE PLAN
The patient is Stable - Low risk of patient condition declining or worsening         Progress made toward(s) clinical / shift goals:    Problem: Knowledge Deficit - L&D  Goal: Patient and family/caregivers will demonstrate understanding of plan of care, disease process/condition, diagnostic tests and medications  Outcome: Progressing  Note: POC discussed with pt. And family. Questions answered. Pt. And family verbalize understanding at this time.      Problem: Risk for Injury  Goal: Patient and fetus will be free of preventable injury/complications  Outcome: Progressing  Note: Pt. And fetus to remain injury free during admission. Safety protocols discussed with pt. And family. Pt. And family agree with hospital safety protocols at this time.

## 2021-08-11 NOTE — FLOWSHEET NOTE
1900: Report given by CHERYL Mackay. POC discussed, cares assumed.   1950: 7-8/90/-1 per STEPHIE Radford.   2140: POC discussed with STEPHIE Radford. RN to recheck pt.   2145: 10/100/0. STEPHIE Radford updated on complete status. POC to labor down at this time per STEPHIE. Pt. And family updated on POC and agree at this time. Pt. Comfortable with epidural.   2318: Pushing initiated with STEPHIE Radford at BS.   2354: Delivery of viable male infant. Infant vigorous at delivery. Infant remains in delivery room.   0004: Delivery of intact placenta.   0005: Pitocin initiated per STEPHIE Radford    0005: Epidural off per protocol by RN.   0140: Pt. assisted up to toilet. Void successful. Gown changed, dalila care done, dalila pad and mesh underwear placed.  0145: Pt. Transferred to  in wheelchair with infant in arms, both in stable condition. BSR done, and bands checked with PP RN. All cares relinquished at this time.

## 2021-08-11 NOTE — PROGRESS NOTES
Obstetrics & Gynecology Post-Delivery Progress Note    Date of Service      30 y.o.  s/p vaginal, spontaneous  Delivery date: 8/10/21      Subjective  Pt reports that she is doing well without complaint  Pain: No  Bleeding: lochia minimal  Tolerating PO: yes  Voiding: without difficulty  Ambulating: yes  Passing flatus: Yes  Feeding: breastfeeding well      Objective  24hr VS:  Temp:  [35.6 °C (96 °F)-36.8 °C (98.2 °F)] 36.8 °C (98.2 °F)  Pulse:  [] 89  Resp:  [17-19] 18  BP: ()/(48-91) 126/70  SpO2:  [96 %-98 %] 98 %    Physical Exam  Gen: well  CV: RRR, no murmur   Resp: unlabored respirations, no intercostal retractions or accessory muscle use, clear to auscultation without rales or wheezes  Abd: nontender, soft, non-distended  Fundus: firm and at umbilicus  Incision: not applicable, (vaginal delivery)  Ext: symmetric and no edema, calves nontender    Labs:  Recent Labs     08/10/21  1105   WBC 10.6   RBC 4.57   HEMOGLOBIN 11.8*   HEMATOCRIT 36.1*   MCV 79.0*   MCH 25.8*   RDW 43.2   PLATELETCT 429   MPV 10.0   NEUTSPOLYS 69.40   LYMPHOCYTES 21.20*   MONOCYTES 6.10   EOSINOPHILS 0.90   BASOPHILS 0.40         Medications  acetaminophen, 1,000 mg, Oral, Q6HRS      PRN medications: LR, ondansetron **OR** ondansetron, ibuprofen      Assessment/Plan  Jasmyne Olivas is a 30 y.o.yo  postpartum day #1  s/p vaginal, spontaneous    - Post care: meeting all goals  - bleeding minimal  - Pain: controlled  - Rh-, Rubella Immune  - Method of Feeding: plans to breastfeed  - Method of Contraception: will discuss at follow up  VTE prophylaxis: none indicated    - Disposition: likely home PPD#2     Dima Norwood M.D.

## 2021-08-11 NOTE — ANESTHESIA TIME REPORT
Anesthesia Start and Stop Event Times     Date Time Event    8/10/2021 1524 Ready for Procedure     1525 Anesthesia Start     2354 Anesthesia Stop        Responsible Staff  08/10/21    Name Role Begin End    Edilson Alfaro M.D. Anesth 1525 1272        Preop Diagnosis (Free Text):  Pre-op Diagnosis             Preop Diagnosis (Codes):    Post op Diagnosis  Pregnancy      Premium Reason  A. 3PM - 7AM    Comments:

## 2021-08-11 NOTE — CARE PLAN
The patient is Stable - Low risk of patient condition declining or worsening    Problem: Psychosocial - Postpartum  Goal: Patient will verbalize and demonstrate effective bonding and parenting behavior  Outcome: Progressing     Problem: Altered Physiologic Condition  Goal: Patient physiologically stable as evidenced by normal lochia, palpable uterine involution and vitals within normal limits  Outcome: Progressing

## 2021-08-11 NOTE — PROGRESS NOTES
"S: Patient is here for IOL secondary to post dates. Received gel last night. Was started on pitocin, but has had a couple of prolonged decelerations necessitating stopping pitocin. Received epidural anesthesia, and is comfortable. She had SROM earlier today. Will check SVE, and attempt IUPC after discussion with patient. She is agreeable to this plan.     O: /75   Pulse 68   Temp 36.4 °C (97.6 °F) (Temporal)   Resp 19   Ht 1.803 m (5' 11\")   Wt 117 kg (257 lb)   LMP 10/31/2020 (Approximate)   SpO2 96%   BMI 35.84 kg/m²            FHTs:  Baseline 140, variability: moderate, accels: absent, decels:absent        Ewa Gentry: Contractions q 2-4 minutes        SVE: 7-8/90/-1, IUPC attempted, but unable to pass. With good cervical change, will continue with TOCO and re-attempt as needed     A/P:    1.  IUP @ 40w3d  2.  Cat 2 FHTs    3.  Active labor  4.  Anticipate     Meg Radford CNM, APRN    DR Altman is the attending today  "

## 2021-08-11 NOTE — PROCEDURES
Jasmyne Olivas is a 30 y.o. female    VAGINAL DELIVERY NOTE:    OB History    Para Term  AB Living   5 2 2   3 2   SAB TAB Ectopic Molar Multiple Live Births     3     0 2      # Outcome Date GA Lbr Golden/2nd Weight Sex Delivery Anes PTL Lv   5 Term 08/10/21 40w3d / 02:09 3.29 kg (7 lb 4.1 oz) M Vag-Spont EPI N ANGELO   4 Term 16 40w0d  3.175 kg (7 lb) F Vag-Spont EPI N ANGELO   3 TAB            2 TAB            1 TAB                Weeks gestation: 40w3d  Diagnosis: Term IUP, post dates IOL  GBS: positive- received 3 doses of PCN    Jasmyne was admitted on the morning of 8/10/2021 for post dates IOL. She was 3 cm dilated, and pitocin was started. She had SROM at 1330 with clear fluid. She continued to contract and dilate to C/C/+2. She pushed for 30 minutes to have a  of a viable male infant over an intact perineum. There was questionable light mec noted during pushing, so RT was called to attend delivery, but baby was active and fluid was clear, so they were dismissed. No cord gases were collected, and baby was left in room to recover with mom.     of viable male at 2356 over a intact perineum. Nuchal cord present: no. Shoulders delivered easily.  Apgars 8 and 9 at 1 and 5 minutes respectively  Birth weight: pending at this time    Placenta: spontaneous and intact at 0004 with 3 VC    Laceration: none    Anesthesia: Epidural  Excellent hemostasis  EBL: 100 ml    Sponge and needle counts correct: yes    Tolerated procedure well  Patient and infant will be transferred to postpartum unit to recover    Meg MOOREM, APRN  Dr Altman is the attending MD today

## 2021-08-11 NOTE — PROGRESS NOTES
Jasmyne Olivas is a 30 y.o. female    VAGINAL DELIVERY NOTE:    OB History    Para Term  AB Living   5 1 1   3 1   SAB TAB Ectopic Molar Multiple Live Births     3       1      # Outcome Date GA Lbr Golden/2nd Weight Sex Delivery Anes PTL Lv   5 Current            4 Term 16 40w0d  3.175 kg (7 lb) F Vag-Spont EPI N ANGELO   3 TAB            2 TAB            1 TAB                Weeks gestation: 40w3d  Diagnosis: Term IUP, post dates IOL  GBS: positive- received 3 doses of PCN    Jasmyne was admitted on the morning of 8/10/2021 for post dates IOL. She was 3 cm dilated, and pitocin was started. She had SROM at 1330 with clear fluid. She continued to contract and dilate to C/C/+2. She pushed for 30 minutes to have a  of a viable male infant over an intact perineum. There was questionable light mec noted during pushing, so RT was called to attend delivery, but baby was active and fluid was clear, so they were dismissed. No cord gases were collected, and baby was left in room to recover with mom.     of viable male at 2356 over a intact perineum. Nuchal cord present: no. Shoulders delivered easily.  Apgars 8 and 9 at 1 and 5 minutes respectively  Birth weight: pending at this time    Placenta: spontaneous and intact at 0004 with 3 VC    Laceration: none    Anesthesia: Epidural  Excellent hemostasis  EBL: 100 ml    Sponge and needle counts correct: yes    Tolerated procedure well  Patient and infant will be transferred to postpartum unit to recover    Meg MOOREM, APRN  Dr Altman is the attending MD today

## 2021-08-11 NOTE — LACTATION NOTE
This note was copied from a baby's chart.  Baby 40.3 weeks, , MOB Hx + breast changes during pregnancy, no risk factors. Baby is swaddled in crib, asleep. MOB reports she breast fed 1st baby x 18 months, with good supply> Encouraged mother to keep baby STS as much as possible while awake. MOB requests help with latch, FOB changed mec diaper. Baby awake & cueing, LC assisted baby to right breast using cross cradle hold- baby opened wide for deep latch - see latch assessment score. Left breast is smaller then right, asymmetrical.     Teaching reviewed on feeding on cue, no schedule, breastfeed a minimum 8 times in 24 hours no longer than 4 hours from last feed & cluster feeding is normal behavior. Hand expression demo done, easily expressed drop of colostrum from right breast. Mother states she learned to hand express with 1st baby.     MOB has [place] [facility] and plans to follow-up with WIC once discharged. NNB Resources given for breastfeeding support.    Breastfeeding plan:  Breastfeed on cue a minimum 8x/24 hours no longer than 4 hours from last feed.

## 2021-08-11 NOTE — ANESTHESIA POSTPROCEDURE EVALUATION
Patient: Jasmyne Olivas    Procedure Summary     Date: 08/10/21 Room / Location:     Anesthesia Start: 1525 Anesthesia Stop: 2354    Procedure: Labor Epidural Diagnosis:     Scheduled Providers:  Responsible Provider: Edilson Alfaro M.D.    Anesthesia Type: epidural ASA Status: 2          Final Anesthesia Type: epidural  Last vitals  BP   Blood Pressure: 138/78    Temp   36.6 °C (97.9 °F)    Pulse   79   Resp   18    SpO2   96 %      Anesthesia Post Evaluation    Patient location during evaluation: floor  Patient participation: complete - patient participated  Level of consciousness: awake and alert    Airway patency: patent  Anesthetic complications: no  Cardiovascular status: hemodynamically stable  Respiratory status: acceptable  Hydration status: euvolemic    PONV: none          No complications documented.

## 2021-08-11 NOTE — PROGRESS NOTES
0200 Pt arrived to S351 via wheelchair with L&D RN. Report received from Debbie LUNA. Assessment completed. Pt states pain level at 5/10. Pt oriented to room, call light, infant security, emergency call light, and unit routine. Encouraged to call for assistance.

## 2021-08-11 NOTE — NON-PROVIDER
Obestetrics and Gynecology Post-Partum Progress Note    Date of Service: 08/11/21  PostPartum day #1:    Subjective:   Pt reports feeling well, pain is tolerable with pain medication.  Pt is ambulating without difficulty, has voided spontaneously following delivery around 1am this morning, is passing flatus.  Is tolerating PO well.  Reports her bleeding is decreasing with time, down from 3 pads last night to one pad with light bleeding this morning.    Breast feeding.: yes, reports this is going well and baby is latching.    24hr VS:  Temp:  [35.6 °C (96 °F)-36.8 °C (98.2 °F)] 36.8 °C (98.2 °F)  Pulse:  [] 89  Resp:  [17-19] 18  BP: ()/(48-91) 126/70  SpO2:  [96 %-98 %] 98 %      Intake/Output Summary (Last 24 hours) at 8/11/2021 0731  Last data filed at 8/10/2021 2354  Gross per 24 hour   Intake --   Output 100 ml   Net -100 ml     Gen: AAO, NAD  CV: RRR  Resp: clear to auscultation bilaterally  Abd: soft, ND, appropriately tender to palpation, no rebound/guarding, +BS; fundus palpable below umbilcus  Ext: NT, trace edema, bilaterally    Meds:   Current Facility-Administered Medications:   •  LR infusion, , Intravenous, PRN, Meg Radford, C.N.M.  •  ondansetron (ZOFRAN ODT) dispertab 4 mg, 4 mg, Oral, Q6HRS PRN **OR** ondansetron (ZOFRAN) syringe/vial injection 4 mg, 4 mg, Intravenous, Q6HRS PRN, Dima Norwood M.D., 4 mg at 08/10/21 2000  •  [COMPLETED] oxytocin (PITOCIN) infusion (for postpartum), 2,000 mL/hr, Intravenous, Once, Stopped at 08/11/21 0127 **FOLLOWED BY** oxytocin (PITOCIN) infusion (for postpartum),  mL/hr, Intravenous, Continuous, Dima Norwood M.D., Last Rate: 125 mL/hr at 08/11/21 0127, 125 mL/hr at 08/11/21 0127  •  ibuprofen (MOTRIN) tablet 800 mg, 800 mg, Oral, Q8HRS PRN, Dima Norwood M.D., 800 mg at 08/11/21 0237  •  acetaminophen (TYLENOL) tablet 1,000 mg, 1,000 mg, Oral, Q6HRS, Dima Norwood M.D.    Lab:   Recent Results (from the past 48 hour(s))   Hold  Blood Bank Specimen (Not Tested)    Collection Time: 08/10/21 11:05 AM   Result Value Ref Range    Holding Tube - Bb DONE    CBC WITH DIFFERENTIAL    Collection Time: 08/10/21 11:05 AM   Result Value Ref Range    WBC 10.6 4.8 - 10.8 K/uL    RBC 4.57 4.20 - 5.40 M/uL    Hemoglobin 11.8 (L) 12.0 - 16.0 g/dL    Hematocrit 36.1 (L) 37.0 - 47.0 %    MCV 79.0 (L) 81.4 - 97.8 fL    MCH 25.8 (L) 27.0 - 33.0 pg    MCHC 32.7 (L) 33.6 - 35.0 g/dL    RDW 43.2 35.9 - 50.0 fL    Platelet Count 429 164 - 446 K/uL    MPV 10.0 9.0 - 12.9 fL    Neutrophils-Polys 69.40 44.00 - 72.00 %    Lymphocytes 21.20 (L) 22.00 - 41.00 %    Monocytes 6.10 0.00 - 13.40 %    Eosinophils 0.90 0.00 - 6.90 %    Basophils 0.40 0.00 - 1.80 %    Immature Granulocytes 2.00 (H) 0.00 - 0.90 %    Nucleated RBC 0.00 /100 WBC    Neutrophils (Absolute) 7.35 (H) 2.00 - 7.15 K/uL    Lymphs (Absolute) 2.24 1.00 - 4.80 K/uL    Monos (Absolute) 0.64 0.00 - 0.85 K/uL    Eos (Absolute) 0.09 0.00 - 0.51 K/uL    Baso (Absolute) 0.04 0.00 - 0.12 K/uL    Immature Granulocytes (abs) 0.21 (H) 0.00 - 0.11 K/uL    NRBC (Absolute) 0.00 K/uL   SARS-COV Antigen ANA: Collect dry nasal swab    Collection Time: 08/10/21 11:05 AM   Result Value Ref Range    SARS-CoV-2 Source Nasal Swab     SARS-COV ANTIGEN ANA NotDetected Not-Detected   SARS-CoV-2 PCR (24 hour In-House): Collect NP swab in VTM    Collection Time: 08/10/21 11:05 AM    Specimen: Nasopharyngeal; Respirate   Result Value Ref Range    SARS-CoV-2 Source NP Swab     SARS-CoV-2 by PCR NotDetected    PROTEIN/CREAT RATIO URINE    Collection Time: 08/10/21 11:50 PM   Result Value Ref Range    Total Protein, Urine 85.0 (H) 0.0 - 15.0 mg/dL    Creatinine, Random Urine 137.83 mg/dL    Protein Creatinine Ratio 617 (H) 10 - 107 mg/g       A/P: 30 y.o.  PPD1 s/p  @ 40w3d for postdates.  - Doing well postpartum, meeting all postop milestones; encouraged ambulation, cont routine postop care  - infant: doing well at bedside,  pt is BFing  - Rh+/RI  -Before discharge,  patient on postpartum blues/depression, return to hospital for increased pain at incision site, fevers, or changes in lochia, or increased bleeding. Follow up for wound check in one week. Follow up appointment in 4-5 weeks.   - ppx: SCDs    dispo: anticipate discharge PPD2      Castillo Mauricio, MS3

## 2021-08-12 ENCOUNTER — PHARMACY VISIT (OUTPATIENT)
Dept: PHARMACY | Facility: MEDICAL CENTER | Age: 31
End: 2021-08-12
Payer: COMMERCIAL

## 2021-08-12 VITALS
OXYGEN SATURATION: 96 % | BODY MASS INDEX: 35.98 KG/M2 | WEIGHT: 257 LBS | SYSTOLIC BLOOD PRESSURE: 115 MMHG | HEIGHT: 71 IN | DIASTOLIC BLOOD PRESSURE: 74 MMHG | HEART RATE: 83 BPM | RESPIRATION RATE: 18 BRPM | TEMPERATURE: 98.3 F

## 2021-08-12 PROCEDURE — A9270 NON-COVERED ITEM OR SERVICE: HCPCS

## 2021-08-12 PROCEDURE — RXMED WILLOW AMBULATORY MEDICATION CHARGE: Performed by: PHYSICIAN ASSISTANT

## 2021-08-12 PROCEDURE — 700102 HCHG RX REV CODE 250 W/ 637 OVERRIDE(OP)

## 2021-08-12 RX ORDER — IBUPROFEN 800 MG/1
800 TABLET ORAL EVERY 8 HOURS PRN
Qty: 30 TABLET | Refills: 0 | Status: SHIPPED | OUTPATIENT
Start: 2021-08-12

## 2021-08-12 RX ADMIN — IBUPROFEN 800 MG: 800 TABLET, FILM COATED ORAL at 08:12

## 2021-08-12 NOTE — PROGRESS NOTES
Discussed discharge education, and follow up information for infant and MOB. Infant and MOB's bands matched. Cord clamp off. Cuddles removed. Infant placed in car seat by MOB. Checked per RN. Infant and MOB escorted by CNA to [facility]. Infant and MOB in stable condition.

## 2021-08-12 NOTE — DISCHARGE SUMMARY
Discharge Summary:      Jasmyne Olivas    Admit Date:   8/10/2021  Discharge Date:  2021     Admitting diagnosis:  Indication for care in labor or delivery [O75.9]  Discharge Diagnosis: Status post vaginal, spontaneous.  Pregnancy Complications: group B strep (untreated)  Tubal Ligation:  no        History:  Past Medical History:   Diagnosis Date   • Rh negative state in antepartum period 2021     OB History    Para Term  AB Living   5 2 2   3 2   SAB TAB Ectopic Molar Multiple Live Births     3     0 2      # Outcome Date GA Lbr Golden/2nd Weight Sex Delivery Anes PTL Lv   5 Term 08/10/21 40w3d / 02:09 3.29 kg (7 lb 4.1 oz) M Vag-Spont EPI N ANGELO   4 Term 16 40w0d  3.175 kg (7 lb) F Vag-Spont EPI N ANGELO   3 TAB            2 TAB            1 TAB                 Patient has no known allergies.  Patient Active Problem List    Diagnosis Date Noted   • Rh negative state in antepartum period 2021   • Group beta Strep positive 2021   • Encounter for supervision of normal pregnancy in second trimester 2021        Hospital Course:   30 y.o. , now para 2, was admitted with the above mentioned diagnosis, underwent Active Labor, vaginal, spontaneous. Patient postpartum course was unremarkable, with progressive advancement in diet , ambulation and toleration of oral analgesia. Patient without complaints today and desires discharge.      Vitals:    21 1455 21 1815 21 2200 21 0600   BP: 130/84 117/65 131/77 115/74   Pulse: 85 84 79 83   Resp: 19 18 18 18   Temp:  37.1 °C (98.7 °F) 36.9 °C (98.4 °F) 36.8 °C (98.3 °F)   TempSrc:  Temporal Temporal Temporal   SpO2: 96% 94% 97% 96%   Weight:       Height:           Current Facility-Administered Medications   Medication Dose   • LR infusion     • acetaminophen (TYLENOL) tablet 1,000 mg  1,000 mg   • ondansetron (ZOFRAN ODT) dispertab 4 mg  4 mg    Or   • ondansetron (ZOFRAN) syringe/vial injection 4 mg  4 mg    • oxytocin (PITOCIN) infusion (for postpartum)   mL/hr   • ibuprofen (MOTRIN) tablet 800 mg  800 mg       Exam:  Breast Exam: negative  Abdomen: Abdomen soft, non-tender. BS normal. No masses,  No organomegaly  Fundus Non Tender: yes  Incision: none  Perineum: perineum intact  Extremity: extremities, peripheral pulses and reflexes normal     Labs:  Recent Labs     08/10/21  1105 08/11/21  0828   WBC 10.6 16.6*   RBC 4.57 4.27   HEMOGLOBIN 11.8* 10.9*   HEMATOCRIT 36.1* 33.5*   MCV 79.0* 78.5*   MCH 25.8* 25.5*   MCHC 32.7* 32.5*   RDW 43.2 42.6   PLATELETCT 429 388   MPV 10.0 9.8        Activity:   Discharge to home  Pelvic Rest x 6 weeks    Assessment:  normal postpartum course  Discharge Assessment: No areas of skin breakdown/redness; surgical incision intact/healing     Follow up: .[facility] or [facility] in 5 weeks for vaginal ; 1 week for incision check.      Discharge Meds:   Current Outpatient Medications   Medication Sig Dispense Refill   • ibuprofen (MOTRIN) 800 MG Tab Take 1 Tablet by mouth every 8 hours as needed (For cramping after delivery; do not give if patient is receiving ketorolac (Toradol)). 30 Tablet 0       Jae Blackman, P.A.

## 2021-08-12 NOTE — LACTATION NOTE
This note was copied from a baby's chart.  Baby latched and breastfeeding well to left breast. Denies pain or discomfort with feeding.  Discussed importance of deep latch and states she is aware of this importance and how to obtain and how to re-latch if necessary. Currently denies pain.  Voices feeling good about going home and planning on breastfeeding exclusively for as long as possible.  Has outpatient resources available.  Will contact her WIC counselor if she decides to start using breast pump at home. Reminded to make sure baby feeds at least 8-10 times every 24 hours and to keep all followup MD appointments.  Denies any current questions or concerns at his time.

## 2021-08-12 NOTE — DISCHARGE PLANNING
Meds-to-Beds: Discharge prescription orders listed below delivered to patient's bedside. RN notified. Patient counseled.      Current Outpatient Medications   Medication Sig Dispense Refill   • ibuprofen (MOTRIN) 800 MG Tab Take 1 Tablet by mouth every 8 hours as needed (For cramping after delivery). 30 Tablet 0        Maryjo Herrera, PharmD

## 2021-08-12 NOTE — PROGRESS NOTES
1950 Assessment complete. VSS. Fundus firm, lochia light. Pain controlled with PRN medications per MAR. Pt will call to request pain medications. FOB at bedside bonding with pt and baby. States voiding without difficulty. POC discussed. Encouraged to call with need. Call light in place

## 2021-08-12 NOTE — DISCHARGE INSTRUCTIONS
PATIENT DISCHARGE EDUCATION INSTRUCTION SHEET  REASONS TO CALL YOUR OBSTETRICIAN  · Persistent fever, shaking, chills (Temperature higher than 100.4) may indicate you have an infection  · Heavy bleeding: soaking more than 1 pad per hour; Passing clots an egg-sized clot or bigger may mean you have an postpartum hemorrhage  · Foul odor from vagina or bad smelling or discolored discharge or blood  · Breast infection (Mastitis symptoms); breast pain, chills, fever, redness or red streaks, may feel flu like symptoms  · Urinary pain, burning or frequency  · Incision that is not healing, increased redness, swelling, tenderness or pain, or any pus from episiotomy or  site may mean you have an infection  · Redness, swelling, warmth, or painful to touch in the calf area of your leg may mean you have a blood clot  · Severe or intensified depression, thoughts or feelings of wanting to hurt yourself or someone else   · Pain in chest, obstructed breathing or shortness of breath (trouble catching your breath) may mean you are having a postpartum complication. Call your provider immediately   · Headache that does not get better, even after taking medicine, a bad headache with vision changes or pain in the upper right area of your belly may mean you have high blood pressure or post birth preeclampsia. Call your provider immediately    HAND WASHING  All family and friends should wash their hands:  · Before and after holding the baby  · Before feeding the baby  · After using the restroom or changing the baby's diaper    WOUND CARE  Ask your physician for additional care instructions. In general:  ·  Incision:  · May shower and pat incision dry   · Keep the incision clean and dry  · There should not be any opening or pus from the incision  · Continue to walk at home 3 times a day   · Do NOT lift anything heavier than your baby (over 10 pounds)  · Encourage family to help participate in care of the  to allow  rest and mom time to heal  · Episiotomy/Laceration  · May use dalila-spray bottle, witch hazel pads and dermaplast spray for comfort  · Use dalila-spray bottle after urinating to cleanse perineal area  · To prevent burning during urination spray dalila-water bottle on labial area   · Pat perineal area dry until episiotomy/laceration is healed  · Continue to use dalila-bottle until bleeding stops as needed  · If have a 2nd degree laceration or greater, a Sitz bath can offer relief from soreness, burning, and inflammation   · Sitz Bath   · Sit in 6 inches of warm water and soak laceration as needed until the laceration heals    VAGINAL CARE AND BLEEDING  · Nothing inside vagina for 6 weeks:   · No sexual intercourse, tampons or douching  · Bleeding may continue for 2-4 weeks. Amount and color may vary  · Soaking 1 pad or more in an hour for several hours is considered heavy bleeding  · Passing large egg sized blood clots can be concerning  · If you feel like you have heavy bleeding or are having increasing amount of blood clots call your Obstetrician immediately  · If you begin feeling faint upon standing, feeling sick to your stomach, have clammy skin, a really fast heartbeat, have chills, start feeling confused, dizzy, sleepy or weak, or feeling like you're going to faint call your Obstetrician immediately    HYPERTENSION   Preeclampsia or gestational hypertension are types of high blood pressure that only pregnant women can get. It is important for you to be aware of symptoms to seek early intervention and treatment. If you have any of these symptoms immediately call your Obstetrician    · Vision changes or blurred vision   · Severe headache or pain that is unrelieved with medication and will not go away  · Persistent pain in upper abdomen or shoulder   · Increased swelling of face, feet, or hands  · Difficulty breathing or shortness of breath at rest  · Urinating less than usual    URINATION AND BOWEL MOVEMENTS  · Eating  "more fiber (bran cereal, fruits, and vegetables) and drinking plenty of fluids will help to avoid constipation  · Urinary frequency and urgency after childbirth is normal  · If you experience any urinary pain, burning or frequency call your provider    BIRTH CONTROL  · It is possible to become pregnant at any time after delivery and while breastfeeding  · Plan to discuss a method of birth control with your physician at your post delivery follow up visit    POSTPARTUM BLUES  During the first few days after birth, you may experience a sense of the \"blues\" which may include impatience, irritability or even crying. These feelings come and go quickly. However, as many as 1 in 10 women experience emotional symptoms known as postpartum depression.     POSTPARTUM DEPRESSION    May start as early as the second or third day after delivery or take several weeks or months to develop. Symptoms of \"blues\" are present, but are more intense: Crying spells; loss of appetite; feelings of hopelessness or loss of control; fear of touching the baby; over concern or no concern at all about the baby; little or no concern about your own appearance/caring for yourself; and/or inability to sleep or excessive sleeping. Contact your Obstetrician if you are experiencing any of these symptoms     PREVENTING SHAKEN BABY  If you are angry or stressed, PUT THE BABY IN THE CRIB, step away, take some deep breaths, and wait until you are calm to care for the baby. DO NOT SHAKE THE BABY. You are not alone, call a supporter for help.  · Crisis [facility] Center 24/7 crisis call line (163-909-7486) or (1-702.628.6966)  · You can also text them, text \"ANSWER\" (849756)      "

## 2021-08-12 NOTE — DISCHARGE PLANNING
Discharge Planning Assessment Post Partum     Reason for Referral: History of THC  Address: [place] [address]  Phone: [phone]  Type of Living Situation: living with FOB  Mom Diagnosis: Pregnancy  Baby Diagnosis: [place]-40.3 weeks  Primary Language: English     Name of Baby: Osbaldo Aguilar (: 8/10/21)  Father of the Baby: Dane Aguilar  Involved in baby’s care? Yes  Contact Information: [phone]     Prenatal Care: Yes  Mom's PCP: None  PCP for new baby: Pediatrician list provided to mother     Support System: FOB and FOB's parents  Coping/Bonding between mother & baby: Yes  Source of Feeding: breast feeding  Supplies for Infant: prepared for infant; denies any needs     Mom's Insurance: Medicaid FFS  Baby Covered on Insurance:Yes  Mother Employed/School: Not currently  Other children in the home/names & ages:daughter-age 5     Financial Hardship/Income: No   Mom's Mental status: alert and oriented  Services used prior to admit: Medicaid and MOB has applied for food stamps.  MOB stated she had WIC but missed an appointment due to transportation and may need to re-apply     CPS History: No  Psychiatric History: No, MOB did scored a 10 on the EPDS screen.  Provided mother with counseling resources specializing in maternal mental health  Domestic Violence History: No  Drug/ETOH History: history of THC use.  MOB stated she had nausea for the first 6 months of pregnancy and was using edibles to help.  She stopped using and infant's UDS is negative.  Discussed that she should not use while breast feeding     Resources Provided: pediatrician list, children and family resource list, list of WIC clinics, and post partum support and counseling resources were provided to mother  Referrals Made: diaper bank referral provided      Clearance for Discharge: Infant is cleared to discharge home with mother

## 2021-08-12 NOTE — CARE PLAN
The patient is Stable - Low risk of patient condition declining or worsening    Shift Goals  Clinical Goals: breastfeeding, pain management    Problem: Psychosocial - Postpartum  Goal: Patient will verbalize and demonstrate effective bonding and parenting behavior  Outcome: Progressing     Problem: Altered Physiologic Condition  Goal: Patient physiologically stable as evidenced by normal lochia, palpable uterine involution and vitals within normal limits  Outcome: Progressing  Note: VSS. Fundus firm and lochia light.

## 2021-08-20 ENCOUNTER — TELEPHONE (OUTPATIENT)
Dept: OBGYN | Facility: CLINIC | Age: 31
End: 2021-08-20
Payer: MEDICAID

## 2021-08-20 NOTE — TELEPHONE ENCOUNTER
**Pt left message to [facility] apt/ Called pt back but no answer. LVM for pt to call back** OV